# Patient Record
Sex: FEMALE | Employment: OTHER | URBAN - METROPOLITAN AREA
[De-identification: names, ages, dates, MRNs, and addresses within clinical notes are randomized per-mention and may not be internally consistent; named-entity substitution may affect disease eponyms.]

---

## 2021-07-07 ENCOUNTER — OFFICE VISIT (OUTPATIENT)
Dept: FAMILY MEDICINE CLINIC | Age: 86
End: 2021-07-07
Payer: MEDICARE

## 2021-07-07 VITALS
RESPIRATION RATE: 16 BRPM | OXYGEN SATURATION: 96 % | WEIGHT: 131.8 LBS | BODY MASS INDEX: 26.57 KG/M2 | HEART RATE: 77 BPM | SYSTOLIC BLOOD PRESSURE: 179 MMHG | DIASTOLIC BLOOD PRESSURE: 62 MMHG | HEIGHT: 59 IN

## 2021-07-07 DIAGNOSIS — F41.9 ANXIETY: ICD-10-CM

## 2021-07-07 DIAGNOSIS — H54.7 DECREASED VISUAL ACUITY: ICD-10-CM

## 2021-07-07 DIAGNOSIS — I10 ESSENTIAL HYPERTENSION: Primary | ICD-10-CM

## 2021-07-07 DIAGNOSIS — N81.10 CYSTOCELE, UNSPECIFIED (CODE): ICD-10-CM

## 2021-07-07 PROCEDURE — 99204 OFFICE O/P NEW MOD 45 MIN: CPT | Performed by: FAMILY MEDICINE

## 2021-07-07 PROCEDURE — 1090F PRES/ABSN URINE INCON ASSESS: CPT | Performed by: FAMILY MEDICINE

## 2021-07-07 PROCEDURE — 1101F PT FALLS ASSESS-DOCD LE1/YR: CPT | Performed by: FAMILY MEDICINE

## 2021-07-07 PROCEDURE — G8536 NO DOC ELDER MAL SCRN: HCPCS | Performed by: FAMILY MEDICINE

## 2021-07-07 PROCEDURE — G8419 CALC BMI OUT NRM PARAM NOF/U: HCPCS | Performed by: FAMILY MEDICINE

## 2021-07-07 PROCEDURE — G8510 SCR DEP NEG, NO PLAN REQD: HCPCS | Performed by: FAMILY MEDICINE

## 2021-07-07 PROCEDURE — G8427 DOCREV CUR MEDS BY ELIG CLIN: HCPCS | Performed by: FAMILY MEDICINE

## 2021-07-07 RX ORDER — HYDROCHLOROTHIAZIDE 12.5 MG/1
CAPSULE ORAL
COMMUNITY
Start: 2021-04-13 | End: 2021-08-30 | Stop reason: SDUPTHER

## 2021-07-07 RX ORDER — PRAVASTATIN SODIUM 40 MG/1
40 TABLET ORAL
COMMUNITY
End: 2021-08-30

## 2021-07-07 RX ORDER — ACETAMINOPHEN 325 MG/1
TABLET ORAL
COMMUNITY

## 2021-07-07 RX ORDER — LEVOTHYROXINE SODIUM 75 UG/1
TABLET ORAL
COMMUNITY
Start: 2021-06-03 | End: 2021-08-30 | Stop reason: SDUPTHER

## 2021-07-07 RX ORDER — SERTRALINE HYDROCHLORIDE 25 MG/1
25 TABLET, FILM COATED ORAL DAILY
Qty: 30 TABLET | Refills: 0
Start: 2021-07-07 | End: 2021-08-30

## 2021-07-07 RX ORDER — LORAZEPAM 0.5 MG/1
TABLET ORAL
COMMUNITY
End: 2021-08-30

## 2021-07-07 RX ORDER — METOPROLOL SUCCINATE 50 MG/1
TABLET, EXTENDED RELEASE ORAL
COMMUNITY
Start: 2021-06-27 | End: 2021-08-30 | Stop reason: SDUPTHER

## 2021-07-07 RX ORDER — MOMETASONE FUROATE 50 UG/1
2 SPRAY, METERED NASAL DAILY
COMMUNITY
End: 2021-08-30 | Stop reason: SDUPTHER

## 2021-07-07 NOTE — PROGRESS NOTES
Identified pt with two pt identifiers(name and ). Reviewed record in preparation for visit and have obtained necessary documentation. Chief Complaint   Patient presents with    New Patient     High BP    Establish Care     Pt would like for us to get records from her doctor in  [place], [place] instead of her previous family practice doctor if we need any records. Health Maintenance Due   Topic    Lipid Screen     COVID-19 Vaccine (1)    DTaP/Tdap/Td series (1 - Tdap)    Shingrix Vaccine Age 50> (1 of 2)    Bone Densitometry (Dexa) Screening     Pneumococcal 65+ years (1 of 1 - PPSV23)       Visit Vitals  BP (!) 179/62   Pulse 77   Resp 16   Ht 4' 11\" (1.499 m)   Wt 131 lb 12.8 oz (59.8 kg)   SpO2 96%   BMI 26.62 kg/m²         Coordination of Care Questionnaire:  :   1) Have you been to an emergency room, urgent care, or hospitalized since your last visit? If yes, where when, and reason for visit? NO     2. Have seen or consulted any other health care provider since your last visit? If yes, where when, and reason for visit? [facility]    Patient is accompanied by SELF I have received verbal consent from Seth Sanchez to discuss any/all medical information while they are present in the room.

## 2021-07-07 NOTE — PROGRESS NOTES
HISTORY OF PRESENT ILLNESS  Maria E Moody is a 80 y.o. female. Maria E Moody is here to follow up on their HTN. This is a chronic problem. Compliant with meds. She is very stressed today. Her home blood pressures have been [id number]/, mostly below 140/80. She needs an eye doctor. Also, she needs a dentist.    She needs a gyn for her cystocele. Also, her previous PCP gave her Zoloft for her anxiety, but she hasn't taken it yet. Review of Systems   Eyes: Positive for blurred vision. Respiratory: Negative for shortness of breath. Cardiovascular: Negative for chest pain. Neurological: Negative for dizziness, sensory change, speech change, focal weakness and headaches. Visit Vitals  BP (!) 179/62   Pulse 77   Resp 16   Ht 4' 11\" (1.499 m)   Wt 131 lb 12.8 oz (59.8 kg)   SpO2 96%   BMI 26.62 kg/m²     Physical Exam  Vitals and nursing note reviewed. Constitutional:       General: She is not in acute distress. Appearance: She is well-developed. She is not diaphoretic. Cardiovascular:      Rate and Rhythm: Normal rate and regular rhythm. Heart sounds: Normal heart sounds. No murmur heard. No friction rub. No gallop. Pulmonary:      Effort: Pulmonary effort is normal. No respiratory distress. Breath sounds: Normal breath sounds. No wheezing or rales. Skin:     General: Skin is warm and dry. Neurological:      Mental Status: She is alert and oriented to person, place, and time. ASSESSMENT and PLAN    ICD-10-CM ICD-9-CM    1. Essential hypertension  R13 092.6 METABOLIC PANEL, BASIC   2. Anxiety  F41.9 300.00 sertraline (Zoloft) 25 mg tablet   3. Cystocele, unspecified (CODE)  N81.10 [id number] REFERRAL TO GYNECOLOGY   4. Decreased visual acuity  H54.7 369.9 REFERRAL TO OPHTHALMOLOGY        Blood pressure controlled at home  Labs per orders. Continue current plans.   Start Zoloft  Dental referral  Gynecology referral  Ophthalmology referral    Follow-up and Dispositions    · Return in about 3 weeks (around 7/28/2021) for anxiety. Reviewed plan of care. Patient has provided input and agrees with goals.

## 2021-07-20 ENCOUNTER — OFFICE VISIT (OUTPATIENT)
Dept: OBGYN CLINIC | Age: 86
End: 2021-07-20
Payer: MEDICARE

## 2021-07-20 VITALS
BODY MASS INDEX: 25.8 KG/M2 | WEIGHT: 128 LBS | SYSTOLIC BLOOD PRESSURE: 191 MMHG | HEART RATE: 74 BPM | DIASTOLIC BLOOD PRESSURE: 82 MMHG | HEIGHT: 59 IN

## 2021-07-20 DIAGNOSIS — N81.89 PELVIC FLOOR WEAKNESS: ICD-10-CM

## 2021-07-20 DIAGNOSIS — N81.11 CYSTOCELE, MIDLINE: Primary | ICD-10-CM

## 2021-07-20 PROCEDURE — G8536 NO DOC ELDER MAL SCRN: HCPCS | Performed by: OBSTETRICS & GYNECOLOGY

## 2021-07-20 PROCEDURE — G8432 DEP SCR NOT DOC, RNG: HCPCS | Performed by: OBSTETRICS & GYNECOLOGY

## 2021-07-20 PROCEDURE — 1090F PRES/ABSN URINE INCON ASSESS: CPT | Performed by: OBSTETRICS & GYNECOLOGY

## 2021-07-20 PROCEDURE — 1101F PT FALLS ASSESS-DOCD LE1/YR: CPT | Performed by: OBSTETRICS & GYNECOLOGY

## 2021-07-20 PROCEDURE — G8419 CALC BMI OUT NRM PARAM NOF/U: HCPCS | Performed by: OBSTETRICS & GYNECOLOGY

## 2021-07-20 PROCEDURE — 99203 OFFICE O/P NEW LOW 30 MIN: CPT | Performed by: OBSTETRICS & GYNECOLOGY

## 2021-07-20 PROCEDURE — G8427 DOCREV CUR MEDS BY ELIG CLIN: HCPCS | Performed by: OBSTETRICS & GYNECOLOGY

## 2021-07-20 NOTE — PROGRESS NOTES
This is a Sandip Sprain is a 80 y.o. female female, [facility], who presents for evaluation for a bladder prolapse. This problem had been going on for a at least 6-7 years. She complains of a  bulge. She has no additional symptoms. No pain. Does not protrude outside the vaginal opening. Was followed by specialist in [place] who advised PT. Did this, not clear how helpful it was. Has not done recently. Would like to avoid surgery if possible. Was advised by her PCP 6-7 years ago to apply a \"salve\" - combination of balmex and A&D ointment. Uses this daily with good results. Recently tx'd with Macrobid for UTI. Having \"digestive\" issues. Took probiotic, but only while she was on the abx. Past Medical History:   Diagnosis Date    Anxiety     Cystocele, unspecified (CODE)     Hearing loss     History of tobacco abuse     Hypertension         Past Surgical History:   Procedure Laterality Date    HX CATARACT REMOVAL Bilateral     HX CHOLECYSTECTOMY      HX HERNIA REPAIR         Social History     Socioeconomic History    Marital status: UNKNOWN     Spouse name: Not on file    Number of children: Not on file    Years of education: Not on file    Highest education level: Not on file   Tobacco Use    Smoking status: Former Smoker     Quit date:      Years since quittin.11    Smokeless tobacco: Never Used   Vaping Use    Vaping Use: Never used   Substance and Sexual Activity    Alcohol use: Yes     Comment: rare    Drug use: Never    Sexual activity: Not Currently     Partners: Male     Social Determinants of Health     Financial Resource Strain:     Difficulty of Paying Living Expenses:    Food Insecurity:     Worried About Running Out of Food in the Last Year:     Ran Out of Food in the Last Year:    Transportation Needs:     Lack of Transportation (Medical):      Lack of Transportation (Non-Medical):    Physical Activity:     Days of Exercise per Week:     Minutes of Exercise per Session:    Stress:     Feeling of Stress :    Social Connections:     Frequency of Communication with Friends and Family:     Frequency of Social Gatherings with Friends and Family:     Attends Buddhist Services:     Active Member of Clubs or Organizations:     Attends Club or Organization Meetings:     Marital Status:            OB History   No obstetric history on file. PHYSICAL EXAMINATION    Visit Vitals  BP (!) 191/82   Pulse 74   Ht 4' 11\" (1.499 m)   Wt 128 lb (58.1 kg)   BMI 25.85 kg/m²         Constitutional  · Appearance: well-nourished, well developed, alert, in no acute distress    HENT  · Head and Face: appears normal      Genitourinary  · External Genitalia: normal appearance for age with atrophy, no discharge present, no tenderness present, no inflammatory lesions present, no masses present  · Vagina:atrophic vaginal vault with pink epithelium and flattening of rugae, mucosa intact, no ulceration, with gr 1-2 cystocele (near hymenal ring, does not protrude through, poor tone, no discharge present, no inflammatory lesions present, no masses present  · Bladder: non-tender to palpation  · Urethra: appears normal  · Cervix: wnl   · Uterus: sm, NT  · Adnexa: no adnexal tenderness present, no adnexal masses present  · Perineum: perineum within normal limits, no evidence of trauma, no rashes or skin lesions present  · Anus: anus within normal limits, no hemorrhoids present  · Inguinal Lymph Nodes: no lymphadenopathy present    Skin  · General Inspection: no rash, no lesions identified    Neurologic/Psychiatric  · Mental Status:  · Orientation: grossly oriented to person, place and time  · Mood and Affect: mood normal, affect appropriate          A&P - cystocele. Minimally symptomatic. Poor tone. Wishes to avoid surgery. Discussed surgery only needed if she is having significant sx such as pain, break down of tissue, recurrent UTI.   She wishes to avoid surgery if possible. Discussed poor tone, could re-establish with PT. Is interested in this but does not want to initiate tx until after her granddaughter's wedding in Sept.  PT referral placed  Can cont applying A&D ointment or just plain vaseline to protect the tissue. Suggest she take probiotics for another couple of weeks. If continues with digestive issues, then should f/u with PCP. If interested in pursuing surgery, should see Dr. Romana Del.

## 2021-08-02 ENCOUNTER — TELEPHONE (OUTPATIENT)
Dept: FAMILY MEDICINE CLINIC | Age: 86
End: 2021-08-02

## 2021-08-02 NOTE — TELEPHONE ENCOUNTER
Pt states she has had problems with diarrhea and stomach pain for a few weeks and only started on the Zoloft a few days ago. She is asking what she should take for the digestive issues and also if she should keep her virtual phone visit for 8/11/21 since she has not been on the Zoloft very long?   Please call [phone]

## 2021-08-03 ENCOUNTER — VIRTUAL VISIT (OUTPATIENT)
Dept: FAMILY MEDICINE CLINIC | Age: 86
End: 2021-08-03
Payer: MEDICARE

## 2021-08-03 DIAGNOSIS — R19.7 DIARRHEA, UNSPECIFIED TYPE: Primary | ICD-10-CM

## 2021-08-03 DIAGNOSIS — R14.3 FLATUS: ICD-10-CM

## 2021-08-03 DIAGNOSIS — R14.2 BELCHING: ICD-10-CM

## 2021-08-03 PROCEDURE — 99443 PR PHYS/QHP TELEPHONE EVALUATION 21-30 MIN: CPT | Performed by: FAMILY MEDICINE

## 2021-08-03 RX ORDER — SIMETHICONE 80 MG
80 TABLET,CHEWABLE ORAL
COMMUNITY
Start: 2021-08-03 | End: 2021-08-30

## 2021-08-03 RX ORDER — LACTOBACILLUS COMBINATION NO.4 3B CELL
CAPSULE ORAL
COMMUNITY
Start: 2021-08-03 | End: 2022-01-28

## 2021-08-03 NOTE — PROGRESS NOTES
Sana Cavanaugh is a 80 y.o. female evaluated via telephone on 8/3/2021. Consent:  She and/or health care decision maker is aware that she may receive a bill for this telephone service, depending on her insurance coverage, and has provided verbal consent to proceed: Yes      Documentation:  I communicated with the patient and/or health care decision maker about her GI symptoms. Details of this discussion including any medical advice provided:       Subjective:   Sana Cavanaugh was seen for Diarrhea (sicne starting Zoloft ) and Abdominal Pain (since starting Zoloft )      Patient presents with:  Diarrhea: sicne starting Zoloft   Abdominal Pain: since starting Zoloft     Evidently, this started after taking antibiotics for a UTI about a month ago. She just started Zoloft last week, but only took it 4 days because because she was having such GI distress, however, it did not make her worse. Also, she is very stressed right now. The diarrhea has resolved, but she is hardly eating anything. She is down to one, formed stool a day, but she is eating a bland diet. Also, she has a lot of gas and belching. Review of Systems   Constitutional: Negative for chills, fever and weight loss. Gastrointestinal: Negative for abdominal pain, blood in stool, constipation, diarrhea, heartburn, melena, nausea and vomiting. Objective:         Assessment & Plan:   Diagnoses and all orders for this visit:    1. Diarrhea, unspecified type    2. Flatus    3. Belching        Diarrhea resolved, still with some GI upset  Likely the antibiotic causing her symptoms  Hold the Zoloft and restart in one week  Simethicone prn  Probiotics     Follow-up and Dispositions    · Return in about 1 month (around 9/3/2021) for anxiety. Reviewed plan of care. Patient has provided input and agrees with goals.       I affirm this is a Patient Initiated Episode with an Established Patient who has not had a related appointment within my department in the past 7 days or scheduled within the next 24 hours.     Total Time: minutes: 21-30 minutes    Note: not billable if this call serves to triage the patient into an appointment for the relevant concern      Valarie Blanco MD

## 2021-08-03 NOTE — PROGRESS NOTES
Chief Complaint   Patient presents with    Diarrhea     sicne starting Zoloft     Abdominal Pain     since starting Zoloft

## 2021-08-30 ENCOUNTER — OFFICE VISIT (OUTPATIENT)
Dept: FAMILY MEDICINE CLINIC | Age: 86
End: 2021-08-30
Payer: MEDICARE

## 2021-08-30 VITALS
DIASTOLIC BLOOD PRESSURE: 68 MMHG | OXYGEN SATURATION: 99 % | HEART RATE: 74 BPM | RESPIRATION RATE: 18 BRPM | WEIGHT: 127 LBS | BODY MASS INDEX: 25.6 KG/M2 | SYSTOLIC BLOOD PRESSURE: 130 MMHG | HEIGHT: 59 IN | TEMPERATURE: 97.1 F

## 2021-08-30 DIAGNOSIS — Z78.0 MENOPAUSE: ICD-10-CM

## 2021-08-30 DIAGNOSIS — F41.9 ANXIETY: Primary | ICD-10-CM

## 2021-08-30 DIAGNOSIS — E03.9 ACQUIRED HYPOTHYROIDISM: ICD-10-CM

## 2021-08-30 DIAGNOSIS — I10 ESSENTIAL HYPERTENSION: ICD-10-CM

## 2021-08-30 DIAGNOSIS — M81.0 AGE-RELATED OSTEOPOROSIS WITHOUT CURRENT PATHOLOGICAL FRACTURE: ICD-10-CM

## 2021-08-30 PROCEDURE — 99215 OFFICE O/P EST HI 40 MIN: CPT | Performed by: FAMILY MEDICINE

## 2021-08-30 PROCEDURE — 1101F PT FALLS ASSESS-DOCD LE1/YR: CPT | Performed by: FAMILY MEDICINE

## 2021-08-30 PROCEDURE — G8427 DOCREV CUR MEDS BY ELIG CLIN: HCPCS | Performed by: FAMILY MEDICINE

## 2021-08-30 PROCEDURE — G8536 NO DOC ELDER MAL SCRN: HCPCS | Performed by: FAMILY MEDICINE

## 2021-08-30 PROCEDURE — 1090F PRES/ABSN URINE INCON ASSESS: CPT | Performed by: FAMILY MEDICINE

## 2021-08-30 PROCEDURE — G8419 CALC BMI OUT NRM PARAM NOF/U: HCPCS | Performed by: FAMILY MEDICINE

## 2021-08-30 PROCEDURE — G8510 SCR DEP NEG, NO PLAN REQD: HCPCS | Performed by: FAMILY MEDICINE

## 2021-08-30 RX ORDER — MOMETASONE FUROATE 50 UG/1
2 SPRAY, METERED NASAL DAILY
Qty: 3 CONTAINER | Refills: 4 | Status: SHIPPED | OUTPATIENT
Start: 2021-08-30 | End: 2021-09-01 | Stop reason: ALTCHOICE

## 2021-08-30 RX ORDER — HYDROCHLOROTHIAZIDE 12.5 MG/1
CAPSULE ORAL
Qty: 90 CAPSULE | Refills: 0 | Status: SHIPPED | OUTPATIENT
Start: 2021-08-30 | End: 2022-01-28

## 2021-08-30 RX ORDER — METOPROLOL SUCCINATE 50 MG/1
50 TABLET, EXTENDED RELEASE ORAL DAILY
Qty: 90 TABLET | Refills: 0 | Status: SHIPPED | OUTPATIENT
Start: 2021-08-30 | End: 2021-09-07

## 2021-08-30 RX ORDER — LEVOTHYROXINE SODIUM 75 UG/1
75 TABLET ORAL DAILY
Qty: 90 TABLET | Refills: 0 | Status: SHIPPED | OUTPATIENT
Start: 2021-08-30 | End: 2021-11-24

## 2021-08-30 RX ORDER — LORAZEPAM 0.5 MG/1
0.5 TABLET ORAL
Qty: 30 TABLET | Refills: 0
Start: 2021-08-30 | End: 2022-01-28

## 2021-08-30 RX ORDER — SERTRALINE HYDROCHLORIDE 50 MG/1
50 TABLET, FILM COATED ORAL DAILY
Qty: 30 TABLET | Refills: 0 | Status: SHIPPED | OUTPATIENT
Start: 2021-08-30 | End: 2021-09-20 | Stop reason: SDUPTHER

## 2021-08-30 NOTE — PROGRESS NOTES
HISTORY OF PRESENT ILLNESS  Ravindra Persaud is a 80 y.o. female. Ravindra Persaud is here to follow up on their anxiety. At her last visit, I started her on Zoloft. She is better. Because she started Zoloft, she thought she needed to stop her Ativan, so she did, however, she needs it from time to time. She has HTN and her SBP was extremely elevated today. The patient notes it is due to her anxiety. It came down to normal after talking with me and relaxing. Review of Systems   Constitutional: Negative for malaise/fatigue and weight loss. No weight gain   Eyes: Negative for blurred vision. Respiratory: Negative for shortness of breath. Cardiovascular: Negative for chest pain and palpitations. Neurological: Positive for dizziness. Negative for sensory change, speech change, focal weakness and headaches. She has chronic vertigo   Psychiatric/Behavioral: Negative for depression, hallucinations, substance abuse and suicidal ideas. The patient is not nervous/anxious and does not have insomnia. Visit Vitals  /68 Comment: left arm, manual cuff after relaxation   Pulse 74   Temp 97.1 °F (36.2 °C) (Temporal)   Resp 18   Ht 4' 11\" (1.499 m)   Wt 127 lb (57.6 kg)   SpO2 99%   BMI 25.65 kg/m²     Physical Exam  Vitals and nursing note reviewed. Constitutional:       General: She is not in acute distress. Appearance: Normal appearance. She is well-developed. She is not diaphoretic. Cardiovascular:      Rate and Rhythm: Normal rate and regular rhythm. Heart sounds: Normal heart sounds. No murmur heard. No friction rub. No gallop. Pulmonary:      Effort: Pulmonary effort is normal. No respiratory distress. Breath sounds: Normal breath sounds. No wheezing or rales. Skin:     General: Skin is warm and dry. Neurological:      Mental Status: She is alert and oriented to person, place, and time. Motor: No tremor.    Psychiatric:         Mood and Affect: Mood normal. Behavior: Behavior normal.         Thought Content: Thought content normal.         Judgment: Judgment normal.         ASSESSMENT and PLAN    ICD-10-CM ICD-9-CM    1. Anxiety  F41.9 300.00 LORazepam (ATIVAN) 0.5 mg tablet      sertraline (Zoloft) 50 mg tablet   2. Essential hypertension  I10 401.9 hydroCHLOROthiazide (MICROZIDE) 12.5 mg capsule      metoprolol succinate (TOPROL-XL) 50 mg XL tablet   3. Acquired hypothyroidism  E03.9 244.9 Synthroid 75 mcg tablet   4. Age-related osteoporosis without current pathological fracture  M81.0 733.01 DEXA BONE DENSITY STUDY AXIAL   5. Menopause  Z78.0 627.2 DEXA BONE DENSITY STUDY AXIAL        Anxiety improving  SBP quite elevated when she is anxious  Increase Zoloft  Restart prn Ativan as her blood pressure is concerningly elevated when she becomes anxious  Bone density    Over 40 minutes was spent in patient care      Follow-up and Dispositions    · Return in about 3 weeks (around 9/20/2021) for anxiety. Reviewed plan of care. Patient has provided input and agrees with goals.

## 2021-08-30 NOTE — PROGRESS NOTES
Chief Complaint   Patient presents with    Anxiety     4 wk f/u     1. Have you been to the ER, urgent care clinic since your last visit? Hospitalized since your last visit? No     2. Have you seen or consulted any other health care providers outside of the [address] [place] [address] since your last visit? Include any pap smears or colon screening.  No     Visit Vitals  BP (!) 200/80   Pulse 74   Temp 97.1 °F (36.2 °C) (Temporal)   Resp 18   Ht 4' 11\" (1.499 m)   Wt 127 lb (57.6 kg)   SpO2 99%   BMI 25.65 kg/m²

## 2021-08-31 ENCOUNTER — TELEPHONE (OUTPATIENT)
Dept: FAMILY MEDICINE CLINIC | Age: 86
End: 2021-08-31

## 2021-09-01 ENCOUNTER — TELEPHONE (OUTPATIENT)
Dept: FAMILY MEDICINE CLINIC | Age: 86
End: 2021-09-01

## 2021-09-01 RX ORDER — FLUNISOLIDE 0.25 MG/ML
2 SOLUTION NASAL 2 TIMES DAILY
Qty: 3 ML | Refills: 4 | Status: SHIPPED | OUTPATIENT
Start: 2021-09-01 | End: 2022-01-28

## 2021-09-01 NOTE — TELEPHONE ENCOUNTER
Mometasone is not covered. Pt will need to have tried and failed both   Flunisolide and Levocetirizine.

## 2021-09-01 NOTE — TELEPHONE ENCOUNTER
----- Message from Blanquita Leal sent at 9/1/2021  2:33 PM EDT -----  Regarding: Dr. Kacie Perdue: [phone]  General Message/Vendor Calls    Caller's first and last name: N/A      Reason for call: PT would like to discuss two medications recently prescribed to her, costing $527.99 and $150 for allergies. Callback required yes/no and why: yes/follow up       Best contact number(s): [phone]      Details to clarify the request: PT states allergies were not discussed in visit and insurance company is questioning her regarding medication was prescribed. PT also states she cannot afford these medications.        Blanquita Leal

## 2021-09-04 DIAGNOSIS — I10 ESSENTIAL HYPERTENSION: ICD-10-CM

## 2021-09-07 RX ORDER — METOPROLOL SUCCINATE 50 MG/1
TABLET, EXTENDED RELEASE ORAL
Qty: 90 TABLET | Refills: 3 | Status: SHIPPED | OUTPATIENT
Start: 2021-09-07 | End: 2022-01-28

## 2021-09-20 ENCOUNTER — OFFICE VISIT (OUTPATIENT)
Dept: FAMILY MEDICINE CLINIC | Age: 86
End: 2021-09-20
Payer: MEDICARE

## 2021-09-20 VITALS
SYSTOLIC BLOOD PRESSURE: 183 MMHG | HEART RATE: 72 BPM | HEIGHT: 59 IN | WEIGHT: 124 LBS | TEMPERATURE: 97 F | BODY MASS INDEX: 25 KG/M2 | DIASTOLIC BLOOD PRESSURE: 67 MMHG | OXYGEN SATURATION: 97 % | RESPIRATION RATE: 20 BRPM

## 2021-09-20 DIAGNOSIS — I10 ESSENTIAL HYPERTENSION: ICD-10-CM

## 2021-09-20 DIAGNOSIS — F41.9 ANXIETY: Primary | ICD-10-CM

## 2021-09-20 DIAGNOSIS — R41.3 MEMORY LOSS: ICD-10-CM

## 2021-09-20 DIAGNOSIS — Z00.00 MEDICARE ANNUAL WELLNESS VISIT, SUBSEQUENT: ICD-10-CM

## 2021-09-20 PROCEDURE — 1090F PRES/ABSN URINE INCON ASSESS: CPT | Performed by: FAMILY MEDICINE

## 2021-09-20 PROCEDURE — G8419 CALC BMI OUT NRM PARAM NOF/U: HCPCS | Performed by: FAMILY MEDICINE

## 2021-09-20 PROCEDURE — 1101F PT FALLS ASSESS-DOCD LE1/YR: CPT | Performed by: FAMILY MEDICINE

## 2021-09-20 PROCEDURE — G8510 SCR DEP NEG, NO PLAN REQD: HCPCS | Performed by: FAMILY MEDICINE

## 2021-09-20 PROCEDURE — G8536 NO DOC ELDER MAL SCRN: HCPCS | Performed by: FAMILY MEDICINE

## 2021-09-20 PROCEDURE — G8427 DOCREV CUR MEDS BY ELIG CLIN: HCPCS | Performed by: FAMILY MEDICINE

## 2021-09-20 PROCEDURE — 99214 OFFICE O/P EST MOD 30 MIN: CPT | Performed by: FAMILY MEDICINE

## 2021-09-20 PROCEDURE — G0439 PPPS, SUBSEQ VISIT: HCPCS | Performed by: FAMILY MEDICINE

## 2021-09-20 RX ORDER — SERTRALINE HYDROCHLORIDE 50 MG/1
50 TABLET, FILM COATED ORAL DAILY
Qty: 90 TABLET | Refills: 0 | Status: SHIPPED | OUTPATIENT
Start: 2021-09-20 | End: 2022-01-28

## 2021-09-20 NOTE — PROGRESS NOTES
This is the Subsequent Medicare Annual Wellness Exam, performed 12 months or more after the Initial AWV or the last Subsequent AWV    I have reviewed the patient's medical history in detail and updated the computerized patient record. Assessment/Plan   Education and counseling provided:  Are appropriate based on today's review and evaluation  Patient has an advanced directive and will bring it in  [address] was discussed but the patient did not wish or was not able to name a surrogate decision maker or provide an [address]     1. Anxiety  -     sertraline (Zoloft) 50 mg tablet; Take 1 Tablet by mouth daily. , Normal, Disp-90 Tablet, R-0  2. Essential hypertension  3. Medicare annual wellness visit, subsequent  4. Memory loss  -     REFERRAL TO NEUROLOGY       Depression Risk Factor Screening     3 most recent PHQ Screens 9/20/2021   Little interest or pleasure in doing things Not at all   Feeling down, depressed, irritable, or hopeless Not at all   Total Score PHQ 2 0       Alcohol Risk Screen   AUDIT Screen Score: AUDIT Score: 1      Document Brief Intervention (corresponds directly with the 5 A's, Ask, Advise, Assess, Assist, and Arrange):  NA    At- Risk Patients (Score 7-15 for women; 8-15 for men)  Discuss concern patient is drinking at unhealthy levels known to increase risk of alcohol-related health problems. Is Patient ready to commit to change? NA    If No:   Encourage reflection   Discuss short term and long term health risks of consuming alcohol   Barriers to change   Reaffirm willingness to help / Educational materials provided  If Yes:   Set goal  [employer] provided    Harmful use or Dependence (Score 16 or greater)   Discuss short term and long term health risks of consuming alcohol   Recommendations   Negotiate drinking goal   Recommend addiction specialist/center   Arrange follow-up appointments.                     Functional Ability and Level of Safety    Hearing: she is [place], and had an appointment just before COVID started, so it is still on her list      Activities of Daily Living:  ADL Assessment 9/20/2021   Feeding yourself No Help Needed   Getting from bed to chair No Help Needed   Getting dressed No Help Needed   Bathing or showering No Help Needed   Walk across the room (includes cane/walker) No Help Needed   Using the telphone No Help Needed   Taking your medications No Help Needed   Preparing meals No Help Needed   Managing money (expenses/bills) No Help Needed   Moderately strenuous housework (laundry) No Help Needed   Shopping for personal items (toiletries/medicines) No Help Needed   Shopping for groceries No Help Needed   Driving No Help Needed   Climbing a flight of stairs No Help Needed   Getting to places beyond walking distances No Help Needed           Ambulation: with no difficulty     Fall Risk:  Fall Risk Assessment, last 12 mths 9/20/2021   Able to walk? Yes   Fall in past 12 months? 0   Do you feel unsteady? 0   Are you worried about falling 0      Abuse Screen:  Abuse Screening Questionnaire 9/20/2021   Do you ever feel afraid of your partner? N   Are you in a relationship with someone who physically or mentally threatens you? N   Is it safe for you to go home?  Y            Cognitive Screening    Has your family/caregiver stated any concerns about your memory: no     Cognitive Screening: Abnormal - MMSE (Mini Mental Status Exam), Verbal Fluency Test    Health Maintenance Due     Health Maintenance Due   Topic Date Due    DTaP/Tdap/Td series (1 - Tdap) Never done    Shingrix Vaccine Age 50> (1 of 2) Never done    Pneumococcal 65+ years (1 of 1 - PPSV23) Never done    Medicare Yearly Exam  Never done    Flu Vaccine (1) Never done       Patient Care Team   Patient Care Team:  Remigio Astorga MD as PCP - General (Family Medicine)  Remigio Astorga MD as PCP [facility] Empaneled Provider    History     Patient Active Problem List Diagnosis Code    Hearing loss H91.90    Hypertension I10    Anxiety F41.9    Cystocele, unspecified (CODE) N81.10    History of tobacco abuse Z87.891    Acquired hypothyroidism E03.9    Age-related osteoporosis without current pathological fracture M81.0     Past Medical History:   Diagnosis Date    Acquired hypothyroidism 8/30/2021    Age-related osteoporosis without current pathological fracture 8/30/2021    Anxiety     Cystocele, unspecified (CODE)     Hearing loss     History of tobacco abuse     Hypertension       Past Surgical History:   Procedure Laterality Date    HX CATARACT REMOVAL Bilateral     HX CHOLECYSTECTOMY      HX HERNIA REPAIR       Current Outpatient Medications   Medication Sig Dispense Refill    metoprolol succinate (TOPROL-XL) 50 mg XL tablet TAKE 1 TABLET BY MOUTH EVERY DAY 90 Tablet 3    flunisolide (NASAREL) 25 mcg (0.025 %) spry 2 Sprays by Both Nostrils route two (2) times a day. 3 mL 4    LORazepam (ATIVAN) 0.5 mg tablet Take 1 Tablet by mouth every six (6) hours as needed for Anxiety. Max Daily Amount: 2 mg. 30 Tablet 0    sertraline (Zoloft) 50 mg tablet Take 1 Tablet by mouth daily. 30 Tablet 0    hydroCHLOROthiazide (MICROZIDE) 12.5 mg capsule TAKE 1 CAPSULE BY MOUTH EVERY DAY IN THE MORNING 90 Capsule 0    Synthroid 75 mcg tablet Take 1 Tablet by mouth daily. 90 Tablet 0    lactobacillus combination no.4 (Probiotic) 3 billion cell cap Take  by mouth.  acetaminophen (TylenoL) 325 mg tablet Take  by mouth every four (4) hours as needed for Pain.        Allergies   Allergen Reactions    Fentanyl Other (comments)     Duragesic patch-- allergy as well    Gabapentin Other (comments)     Confusion        Family History   Problem Relation Age of Onset    Hypertension Mother     Elevated Lipids Mother     Cancer Mother         stomach    No Known Problems Father     No Known Problems Brother     No Known Problems Brother     No Known Problems Brother  No Known Problems Brother     No Known Problems Brother      Social History     Tobacco Use    Smoking status: Former Smoker     Quit date:      Years since quittin.7    Smokeless tobacco: Never Used   Substance Use Topics    Alcohol use: Yes     Comment: danyel Muñiz MD

## 2021-09-20 NOTE — PATIENT INSTRUCTIONS

## 2021-09-20 NOTE — PROGRESS NOTES
HISTORY OF PRESENT ILLNESS  Cecile Redmond is a 80 y.o. female. Cecile Redmond is here to follow up on their anxiety. He/she is unchanged, but she has a wedding coming up this weekend and is looking for a place in independent living. Her lease is up in the spring. .     She has HTN and her blood pressure is elevated today. Her home blood pressures have been [phone]. Review of Systems   Constitutional: Negative for malaise/fatigue and weight loss. No weight gain   Eyes: Negative for blurred vision. Respiratory: Negative for shortness of breath. Cardiovascular: Negative for chest pain and palpitations. Neurological: Negative for dizziness, sensory change, speech change, focal weakness and headaches. Psychiatric/Behavioral: Negative for depression, hallucinations, substance abuse and suicidal ideas. The patient is nervous/anxious. The patient does not have insomnia. Visit Vitals  BP (!) 183/67   Pulse 72   Temp 97 °F (36.1 °C) (Temporal)   Resp 20   Ht 4' 11\" (1.499 m)   Wt 124 lb (56.2 kg)   SpO2 97%   BMI 25.04 kg/m²     BP Readings from Last 3 Encounters:   09/20/21 (!) 183/67   08/30/21 130/68   07/20/21 (!) 191/82       Physical Exam  Vitals and nursing note reviewed. Constitutional:       General: She is not in acute distress. Appearance: Normal appearance. She is well-developed. She is not diaphoretic. Cardiovascular:      Rate and Rhythm: Normal rate and regular rhythm. Heart sounds: Normal heart sounds. No murmur heard. No friction rub. No gallop. Pulmonary:      Effort: Pulmonary effort is normal. No respiratory distress. Breath sounds: Normal breath sounds. No wheezing or rales. Skin:     General: Skin is warm and dry. Neurological:      Mental Status: She is alert and oriented to person, place, and time. Motor: No tremor. Psychiatric:         Mood and Affect: Mood normal.         Behavior: Behavior normal.         Thought Content:  Thought content normal.         Judgment: Judgment normal.         ASSESSMENT and PLAN    ICD-10-CM ICD-9-CM    1. Anxiety  F41.9 300.00 sertraline (Zoloft) 50 mg tablet   2. Essential hypertension  I10 401.9    3. Medicare annual wellness visit, subsequent  Z00.00 V70.0    4. Memory loss  R41.3 780.93 REFERRAL TO NEUROLOGY        Anxiety no better, but some situational  Blood pressures acceptable at home  Continue current plans. Refills per orders    Follow-up and Dispositions    · Return in about 6 weeks (around 11/1/2021) for anxiety. Reviewed plan of care. Patient has provided input and agrees with goals.

## 2021-11-24 DIAGNOSIS — E03.9 ACQUIRED HYPOTHYROIDISM: ICD-10-CM

## 2021-11-24 DIAGNOSIS — I10 PRIMARY HYPERTENSION: Primary | ICD-10-CM

## 2021-11-24 RX ORDER — LEVOTHYROXINE SODIUM 75 UG/1
75 TABLET ORAL DAILY
Qty: 90 TABLET | Refills: 0 | Status: SHIPPED | OUTPATIENT
Start: 2021-11-24 | End: 2022-03-14

## 2021-11-24 NOTE — TELEPHONE ENCOUNTER
Called and spoke with pt. She has been advised of this and will have labs drawn on 11/29/2021 the date of her appointment.

## 2021-11-25 ENCOUNTER — HOSPITAL ENCOUNTER (INPATIENT)
Age: 86
LOS: 6 days | Discharge: NURSING, REHAB OR LONG-TERM CARE | DRG: 535 | End: 2021-12-01
Attending: EMERGENCY MEDICINE | Admitting: INTERNAL MEDICINE
Payer: MEDICARE

## 2021-11-25 ENCOUNTER — APPOINTMENT (OUTPATIENT)
Dept: GENERAL RADIOLOGY | Age: 86
DRG: 535 | End: 2021-11-25
Attending: EMERGENCY MEDICINE
Payer: MEDICARE

## 2021-11-25 ENCOUNTER — APPOINTMENT (OUTPATIENT)
Dept: CT IMAGING | Age: 86
DRG: 535 | End: 2021-11-25
Attending: EMERGENCY MEDICINE
Payer: MEDICARE

## 2021-11-25 DIAGNOSIS — S32.401A CLOSED NONDISPLACED FRACTURE OF RIGHT ACETABULUM, UNSPECIFIED PORTION OF ACETABULUM, INITIAL ENCOUNTER (HCC): ICD-10-CM

## 2021-11-25 DIAGNOSIS — S32.591A CLOSED FRACTURE OF MULTIPLE PUBIC RAMI, RIGHT, INITIAL ENCOUNTER (HCC): Primary | ICD-10-CM

## 2021-11-25 DIAGNOSIS — S32.592A CLOSED FRACTURE OF MULTIPLE RAMI OF LEFT PUBIS, INITIAL ENCOUNTER (HCC): ICD-10-CM

## 2021-11-25 PROBLEM — S32.599A CLOSED FRACTURE OF MULTIPLE PUBIC RAMI (HCC): Status: ACTIVE | Noted: 2021-11-25

## 2021-11-25 LAB
ALBUMIN SERPL-MCNC: 3.4 G/DL (ref 3.5–5)
ALBUMIN/GLOB SERPL: 0.8 {RATIO} (ref 1.1–2.2)
ALP SERPL-CCNC: 99 U/L (ref 45–117)
ALT SERPL-CCNC: ABNORMAL U/L (ref 12–78)
ANION GAP SERPL CALC-SCNC: 7 MMOL/L (ref 5–15)
AST SERPL-CCNC: 31 U/L (ref 15–37)
BASOPHILS # BLD: 0.1 K/UL (ref 0–0.1)
BASOPHILS NFR BLD: 1 % (ref 0–1)
BILIRUB SERPL-MCNC: 0.6 MG/DL (ref 0.2–1)
BUN SERPL-MCNC: 15 MG/DL (ref 6–20)
BUN/CREAT SERPL: 14 (ref 12–20)
CALCIUM SERPL-MCNC: 9 MG/DL (ref 8.5–10.1)
CHLORIDE SERPL-SCNC: 98 MMOL/L (ref 97–108)
CO2 SERPL-SCNC: 27 MMOL/L (ref 21–32)
CREAT SERPL-MCNC: 1.05 MG/DL (ref 0.55–1.02)
DIFFERENTIAL METHOD BLD: ABNORMAL
EOSINOPHIL # BLD: 0.1 K/UL (ref 0–0.4)
EOSINOPHIL NFR BLD: 1 % (ref 0–7)
ERYTHROCYTE [DISTWIDTH] IN BLOOD BY AUTOMATED COUNT: 13.7 % (ref 11.5–14.5)
GLOBULIN SER CALC-MCNC: 4.1 G/DL (ref 2–4)
GLUCOSE SERPL-MCNC: 147 MG/DL (ref 65–100)
HCT VFR BLD AUTO: 45 % (ref 35–47)
HGB BLD-MCNC: 14.9 G/DL (ref 11.5–16)
IMM GRANULOCYTES # BLD AUTO: 0.1 K/UL (ref 0–0.04)
IMM GRANULOCYTES NFR BLD AUTO: 1 % (ref 0–0.5)
LYMPHOCYTES # BLD: 1 K/UL (ref 0.8–3.5)
LYMPHOCYTES NFR BLD: 7 % (ref 12–49)
MCH RBC QN AUTO: 29.2 PG (ref 26–34)
MCHC RBC AUTO-ENTMCNC: 33.1 G/DL (ref 30–36.5)
MCV RBC AUTO: 88.2 FL (ref 80–99)
MONOCYTES # BLD: 0.6 K/UL (ref 0–1)
MONOCYTES NFR BLD: 4 % (ref 5–13)
NEUTS SEG # BLD: 12.2 K/UL (ref 1.8–8)
NEUTS SEG NFR BLD: 86 % (ref 32–75)
NRBC # BLD: 0 K/UL (ref 0–0.01)
NRBC BLD-RTO: 0 PER 100 WBC
PLATELET # BLD AUTO: 208 K/UL (ref 150–400)
PMV BLD AUTO: 10.1 FL (ref 8.9–12.9)
POTASSIUM SERPL-SCNC: 4.2 MMOL/L (ref 3.5–5.1)
PROT SERPL-MCNC: 7.5 G/DL (ref 6.4–8.2)
RBC # BLD AUTO: 5.1 M/UL (ref 3.8–5.2)
SODIUM SERPL-SCNC: 132 MMOL/L (ref 136–145)
WBC # BLD AUTO: 14 K/UL (ref 3.6–11)

## 2021-11-25 PROCEDURE — 99284 EMERGENCY DEPT VISIT MOD MDM: CPT

## 2021-11-25 PROCEDURE — 80053 COMPREHEN METABOLIC PANEL: CPT

## 2021-11-25 PROCEDURE — 72100 X-RAY EXAM L-S SPINE 2/3 VWS: CPT

## 2021-11-25 PROCEDURE — 72192 CT PELVIS W/O DYE: CPT

## 2021-11-25 PROCEDURE — 73502 X-RAY EXAM HIP UNI 2-3 VIEWS: CPT

## 2021-11-25 PROCEDURE — 93005 ELECTROCARDIOGRAM TRACING: CPT

## 2021-11-25 PROCEDURE — 71046 X-RAY EXAM CHEST 2 VIEWS: CPT

## 2021-11-25 PROCEDURE — 74011250636 HC RX REV CODE- 250/636: Performed by: EMERGENCY MEDICINE

## 2021-11-25 PROCEDURE — 65270000029 HC RM PRIVATE

## 2021-11-25 PROCEDURE — 73552 X-RAY EXAM OF FEMUR 2/>: CPT

## 2021-11-25 PROCEDURE — 96374 THER/PROPH/DIAG INJ IV PUSH: CPT

## 2021-11-25 PROCEDURE — 85025 COMPLETE CBC W/AUTO DIFF WBC: CPT

## 2021-11-25 PROCEDURE — 36415 COLL VENOUS BLD VENIPUNCTURE: CPT

## 2021-11-25 RX ORDER — MORPHINE SULFATE 2 MG/ML
2 INJECTION, SOLUTION INTRAMUSCULAR; INTRAVENOUS
Status: COMPLETED | OUTPATIENT
Start: 2021-11-25 | End: 2021-11-25

## 2021-11-25 RX ADMIN — MORPHINE SULFATE 2 MG: 2 INJECTION, SOLUTION INTRAMUSCULAR; INTRAVENOUS at 21:34

## 2021-11-26 ENCOUNTER — APPOINTMENT (OUTPATIENT)
Dept: CT IMAGING | Age: 86
DRG: 535 | End: 2021-11-26
Attending: INTERNAL MEDICINE
Payer: MEDICARE

## 2021-11-26 LAB
ALBUMIN SERPL-MCNC: 3.1 G/DL (ref 3.5–5)
ALBUMIN/GLOB SERPL: 0.8 {RATIO} (ref 1.1–2.2)
ALP SERPL-CCNC: 82 U/L (ref 45–117)
ALT SERPL-CCNC: 18 U/L (ref 12–78)
ANION GAP SERPL CALC-SCNC: 7 MMOL/L (ref 5–15)
AST SERPL-CCNC: 17 U/L (ref 15–37)
BASOPHILS # BLD: 0 K/UL (ref 0–0.1)
BASOPHILS NFR BLD: 0 % (ref 0–1)
BILIRUB SERPL-MCNC: 0.5 MG/DL (ref 0.2–1)
BUN SERPL-MCNC: 13 MG/DL (ref 6–20)
BUN/CREAT SERPL: 14 (ref 12–20)
CALCIUM SERPL-MCNC: 8.7 MG/DL (ref 8.5–10.1)
CHLORIDE SERPL-SCNC: 98 MMOL/L (ref 97–108)
CO2 SERPL-SCNC: 27 MMOL/L (ref 21–32)
CREAT SERPL-MCNC: 0.94 MG/DL (ref 0.55–1.02)
DIFFERENTIAL METHOD BLD: ABNORMAL
EOSINOPHIL # BLD: 0 K/UL (ref 0–0.4)
EOSINOPHIL NFR BLD: 0 % (ref 0–7)
ERYTHROCYTE [DISTWIDTH] IN BLOOD BY AUTOMATED COUNT: 13.7 % (ref 11.5–14.5)
EST. AVERAGE GLUCOSE BLD GHB EST-MCNC: 108 MG/DL
GLOBULIN SER CALC-MCNC: 3.7 G/DL (ref 2–4)
GLUCOSE SERPL-MCNC: 147 MG/DL (ref 65–100)
HBA1C MFR BLD: 5.4 % (ref 4–5.6)
HCT VFR BLD AUTO: 40.7 % (ref 35–47)
HGB BLD-MCNC: 13.2 G/DL (ref 11.5–16)
IMM GRANULOCYTES # BLD AUTO: 0.1 K/UL (ref 0–0.04)
IMM GRANULOCYTES NFR BLD AUTO: 1 % (ref 0–0.5)
LYMPHOCYTES # BLD: 0.8 K/UL (ref 0.8–3.5)
LYMPHOCYTES NFR BLD: 8 % (ref 12–49)
MAGNESIUM SERPL-MCNC: 1.8 MG/DL (ref 1.6–2.4)
MCH RBC QN AUTO: 28.4 PG (ref 26–34)
MCHC RBC AUTO-ENTMCNC: 32.4 G/DL (ref 30–36.5)
MCV RBC AUTO: 87.7 FL (ref 80–99)
MONOCYTES # BLD: 0.6 K/UL (ref 0–1)
MONOCYTES NFR BLD: 6 % (ref 5–13)
NEUTS SEG # BLD: 9.3 K/UL (ref 1.8–8)
NEUTS SEG NFR BLD: 85 % (ref 32–75)
NRBC # BLD: 0 K/UL (ref 0–0.01)
NRBC BLD-RTO: 0 PER 100 WBC
PHOSPHATE SERPL-MCNC: 3 MG/DL (ref 2.6–4.7)
PLATELET # BLD AUTO: 176 K/UL (ref 150–400)
PMV BLD AUTO: 9.9 FL (ref 8.9–12.9)
POTASSIUM SERPL-SCNC: 3.6 MMOL/L (ref 3.5–5.1)
PROT SERPL-MCNC: 6.8 G/DL (ref 6.4–8.2)
RBC # BLD AUTO: 4.64 M/UL (ref 3.8–5.2)
SODIUM SERPL-SCNC: 132 MMOL/L (ref 136–145)
TROPONIN-HIGH SENSITIVITY: 10 NG/L (ref 0–51)
TSH SERPL DL<=0.05 MIU/L-ACNC: 1.92 UIU/ML (ref 0.36–3.74)
WBC # BLD AUTO: 10.8 K/UL (ref 3.6–11)

## 2021-11-26 PROCEDURE — 85025 COMPLETE CBC W/AUTO DIFF WBC: CPT

## 2021-11-26 PROCEDURE — 36415 COLL VENOUS BLD VENIPUNCTURE: CPT

## 2021-11-26 PROCEDURE — 65660000000 HC RM CCU STEPDOWN

## 2021-11-26 PROCEDURE — 2709999900 HC NON-CHARGEABLE SUPPLY

## 2021-11-26 PROCEDURE — 83036 HEMOGLOBIN GLYCOSYLATED A1C: CPT

## 2021-11-26 PROCEDURE — 97165 OT EVAL LOW COMPLEX 30 MIN: CPT | Performed by: OCCUPATIONAL THERAPIST

## 2021-11-26 PROCEDURE — 83735 ASSAY OF MAGNESIUM: CPT

## 2021-11-26 PROCEDURE — 71250 CT THORAX DX C-: CPT

## 2021-11-26 PROCEDURE — 97530 THERAPEUTIC ACTIVITIES: CPT

## 2021-11-26 PROCEDURE — 84100 ASSAY OF PHOSPHORUS: CPT

## 2021-11-26 PROCEDURE — 74011250636 HC RX REV CODE- 250/636: Performed by: INTERNAL MEDICINE

## 2021-11-26 PROCEDURE — 70450 CT HEAD/BRAIN W/O DYE: CPT

## 2021-11-26 PROCEDURE — 80053 COMPREHEN METABOLIC PANEL: CPT

## 2021-11-26 PROCEDURE — 97535 SELF CARE MNGMENT TRAINING: CPT | Performed by: OCCUPATIONAL THERAPIST

## 2021-11-26 PROCEDURE — 72125 CT NECK SPINE W/O DYE: CPT

## 2021-11-26 PROCEDURE — 97162 PT EVAL MOD COMPLEX 30 MIN: CPT

## 2021-11-26 PROCEDURE — 84484 ASSAY OF TROPONIN QUANT: CPT

## 2021-11-26 PROCEDURE — 94761 N-INVAS EAR/PLS OXIMETRY MLT: CPT

## 2021-11-26 PROCEDURE — 84443 ASSAY THYROID STIM HORMONE: CPT

## 2021-11-26 PROCEDURE — 74150 CT ABDOMEN W/O CONTRAST: CPT

## 2021-11-26 PROCEDURE — 74011250637 HC RX REV CODE- 250/637: Performed by: INTERNAL MEDICINE

## 2021-11-26 RX ORDER — MORPHINE SULFATE 2 MG/ML
2 INJECTION, SOLUTION INTRAMUSCULAR; INTRAVENOUS
Status: DISCONTINUED | OUTPATIENT
Start: 2021-11-26 | End: 2021-12-01 | Stop reason: HOSPADM

## 2021-11-26 RX ORDER — ONDANSETRON 4 MG/1
4 TABLET, ORALLY DISINTEGRATING ORAL
Status: DISCONTINUED | OUTPATIENT
Start: 2021-11-26 | End: 2021-12-01 | Stop reason: HOSPADM

## 2021-11-26 RX ORDER — SODIUM CHLORIDE 0.9 % (FLUSH) 0.9 %
5-40 SYRINGE (ML) INJECTION EVERY 8 HOURS
Status: DISCONTINUED | OUTPATIENT
Start: 2021-11-26 | End: 2021-12-01 | Stop reason: HOSPADM

## 2021-11-26 RX ORDER — ONDANSETRON 2 MG/ML
4 INJECTION INTRAMUSCULAR; INTRAVENOUS
Status: DISCONTINUED | OUTPATIENT
Start: 2021-11-26 | End: 2021-12-01 | Stop reason: HOSPADM

## 2021-11-26 RX ORDER — HEPARIN SODIUM 5000 [USP'U]/ML
5000 INJECTION, SOLUTION INTRAVENOUS; SUBCUTANEOUS EVERY 8 HOURS
Status: DISCONTINUED | OUTPATIENT
Start: 2021-11-26 | End: 2021-12-01 | Stop reason: HOSPADM

## 2021-11-26 RX ORDER — SODIUM CHLORIDE 0.9 % (FLUSH) 0.9 %
5-40 SYRINGE (ML) INJECTION AS NEEDED
Status: DISCONTINUED | OUTPATIENT
Start: 2021-11-26 | End: 2021-12-01 | Stop reason: HOSPADM

## 2021-11-26 RX ORDER — OXYCODONE AND ACETAMINOPHEN 5; 325 MG/1; MG/1
1 TABLET ORAL
Status: DISCONTINUED | OUTPATIENT
Start: 2021-11-26 | End: 2021-12-01 | Stop reason: HOSPADM

## 2021-11-26 RX ORDER — ACETAMINOPHEN 650 MG/1
650 SUPPOSITORY RECTAL
Status: DISCONTINUED | OUTPATIENT
Start: 2021-11-26 | End: 2021-12-01 | Stop reason: HOSPADM

## 2021-11-26 RX ORDER — ACETAMINOPHEN 325 MG/1
650 TABLET ORAL
Status: DISCONTINUED | OUTPATIENT
Start: 2021-11-26 | End: 2021-12-01 | Stop reason: HOSPADM

## 2021-11-26 RX ORDER — HYDRALAZINE HYDROCHLORIDE 20 MG/ML
10 INJECTION INTRAMUSCULAR; INTRAVENOUS
Status: DISCONTINUED | OUTPATIENT
Start: 2021-11-26 | End: 2021-12-01 | Stop reason: HOSPADM

## 2021-11-26 RX ORDER — SODIUM CHLORIDE, SODIUM LACTATE, POTASSIUM CHLORIDE, CALCIUM CHLORIDE 600; 310; 30; 20 MG/100ML; MG/100ML; MG/100ML; MG/100ML
75 INJECTION, SOLUTION INTRAVENOUS CONTINUOUS
Status: DISCONTINUED | OUTPATIENT
Start: 2021-11-26 | End: 2021-11-28

## 2021-11-26 RX ORDER — POLYETHYLENE GLYCOL 3350 17 G/17G
17 POWDER, FOR SOLUTION ORAL DAILY PRN
Status: DISCONTINUED | OUTPATIENT
Start: 2021-11-26 | End: 2021-12-01 | Stop reason: HOSPADM

## 2021-11-26 RX ADMIN — Medication 10 ML: at 21:47

## 2021-11-26 RX ADMIN — HEPARIN SODIUM 5000 UNITS: 5000 INJECTION INTRAVENOUS; SUBCUTANEOUS at 14:00

## 2021-11-26 RX ADMIN — MORPHINE SULFATE 2 MG: 2 INJECTION, SOLUTION INTRAMUSCULAR; INTRAVENOUS at 19:19

## 2021-11-26 RX ADMIN — HEPARIN SODIUM 5000 UNITS: 5000 INJECTION INTRAVENOUS; SUBCUTANEOUS at 05:15

## 2021-11-26 RX ADMIN — SODIUM CHLORIDE, POTASSIUM CHLORIDE, SODIUM LACTATE AND CALCIUM CHLORIDE 75 ML/HR: 600; 310; 30; 20 INJECTION, SOLUTION INTRAVENOUS at 19:22

## 2021-11-26 RX ADMIN — Medication 10 ML: at 09:48

## 2021-11-26 RX ADMIN — MORPHINE SULFATE 2 MG: 2 INJECTION, SOLUTION INTRAMUSCULAR; INTRAVENOUS at 09:59

## 2021-11-26 RX ADMIN — MORPHINE SULFATE 2 MG: 2 INJECTION, SOLUTION INTRAMUSCULAR; INTRAVENOUS at 05:14

## 2021-11-26 RX ADMIN — OXYCODONE AND ACETAMINOPHEN 1 TABLET: 5; 325 TABLET ORAL at 21:46

## 2021-11-26 RX ADMIN — SODIUM CHLORIDE, POTASSIUM CHLORIDE, SODIUM LACTATE AND CALCIUM CHLORIDE 75 ML/HR: 600; 310; 30; 20 INJECTION, SOLUTION INTRAVENOUS at 05:14

## 2021-11-26 RX ADMIN — Medication 10 ML: at 15:07

## 2021-11-26 RX ADMIN — HEPARIN SODIUM 5000 UNITS: 5000 INJECTION INTRAVENOUS; SUBCUTANEOUS at 21:46

## 2021-11-26 NOTE — ED PROVIDER NOTES
Ms. Tad Hines is an 81yo female who presents to the ER with complaints of right hip pain. She said that she was carrying a box of food in her hands. She is not exactly sure how she fell. She believes that the box became unsteady and made her fall backwards. She did not hit her head. She not lose consciousness. She felt well prior to the fall. No lightheadedness or dizziness. No chest pain or trouble breathing. She denies any recent changes to her urine or bowel movements. She denies any new numbness, tingling, or weakness. She denies any other complaints. No past medical history on file. No past surgical history on file. No family history on file. Social History     Socioeconomic History    Marital status: SINGLE     Spouse name: Not on file    Number of children: Not on file    Years of education: Not on file    Highest education level: Not on file   Occupational History    Not on file   Tobacco Use    Smoking status: Not on file    Smokeless tobacco: Not on file   Substance and Sexual Activity    Alcohol use: Not on file    Drug use: Not on file    Sexual activity: Not on file   Other Topics Concern    Not on file   Social History Narrative    Not on file     Social Determinants of Health     Financial Resource Strain:     Difficulty of Paying Living Expenses: Not on file   Food Insecurity:     Worried About Running Out of Food in the Last Year: Not on file    Juan Antonio of Food in the Last Year: Not on file   Transportation Needs:     Lack of Transportation (Medical): Not on file    Lack of Transportation (Non-Medical):  Not on file   Physical Activity:     Days of Exercise per Week: Not on file    Minutes of Exercise per Session: Not on file   Stress:     Feeling of Stress : Not on file   Social Connections:     Frequency of Communication with Friends and Family: Not on file    Frequency of Social Gatherings with Friends and Family: Not on file    Attends Scientologist Services: Not on file    Active Member of Clubs or Organizations: Not on file    Attends Club or Organization Meetings: Not on file    Marital Status: Not on file   Intimate Partner Violence:     Fear of Current or Ex-Partner: Not on file    Emotionally Abused: Not on file    Physically Abused: Not on file    Sexually Abused: Not on file   Housing Stability:     Unable to Pay for Housing in the Last Year: Not on file    Number of [place] in the Last Year: Not on file    Unstable Housing in the Last Year: Not on file         ALLERGIES: Fentanyl    Review of Systems   Constitutional: Negative for chills and fever. HENT: Negative for rhinorrhea and sore throat. Respiratory: Negative for cough and shortness of breath. Cardiovascular: Negative for chest pain. Gastrointestinal: Negative for abdominal pain, diarrhea, nausea and vomiting. Genitourinary: Negative for dysuria and urgency. Musculoskeletal:        Hip pain   Skin: Negative for rash. Neurological: Negative for dizziness, weakness and light-headedness. Vitals:    11/25/21 1902   BP: (!) 217/65   Pulse: 66   Resp: (!) 65   Temp: 97.8 °F (36.6 °C)   SpO2: 95%   Weight: 54.4 kg (120 lb)   Height: 4' 11\" (1.499 m)            Physical Exam     Vital signs reviewed. Nursing notes reviewed.     Const:  No acute distress, well developed, well nourished  Head:  Atraumatic, normocephalic  Eyes:  PERRL, conjunctiva normal, no scleral icterus  Neck:  Supple, trachea midline  Cardiovascular: Regular rate  Resp:  No resp distress, no increased work of breathing  Abd:  Soft, non-tender, non-distended  MSK: Tenderness palpation of the right hip, pain with range of motion of the right hip, no L-spine tenderness to palpation  Neuro:  Alert and oriented x3, no cranial nerve defect  Skin:  Warm, dry, intact  Psych: normal mood and affect, behavior is normal, judgement and thought content is normal          MDM  Number of Diagnoses or Management Options     Amount and/or Complexity of Data Reviewed  Clinical lab tests: ordered and reviewed  Tests in the radiology section of CPT®: ordered and reviewed  Review and summarize past medical records: yes    Patient Progress  Patient progress: stable          Perfect Serve Consult for Admission  8:59 PM    ED Room Number: ER01/01  Patient Name and age:  Yue Melchor 80 y.o.  female  Working Diagnosis:   1. Closed fracture of multiple pubic rami, right, initial encounter ([facility] [address]    2. Closed nondisplaced fracture of right acetabulum, unspecified portion of acetabulum, initial encounter ([facility] [address]        COVID-19 Suspicion:  no  Sepsis present:  no  Reassessment needed: no  Readmission: no  Isolation Requirements:  no  Recommended Level of Care:  med/surg  Department:[facility] ED - [phone]  Other:  Ortho has been consulted    Ms. Ange Chao is an 79yo female who presents to the ER after a fall and with hip pain. Xray shows fx. Pt's fall was mechanical. Images seen by ortho. Pt.  To be evaluated for admission by the hospitalist.      Procedures

## 2021-11-26 NOTE — ED NOTES
TRANSFER - OUT REPORT:    Verbal report given to RN(name) on Renetta Ward  being transferred to 416(unit) for routine progression of care       Report consisted of patients Situation, Background, Assessment and   Recommendations(SBAR). Information from the following report(s) SBAR, ED Summary, [facility] and Recent Results was reviewed with the receiving nurse. Lines:   Peripheral IV 11/25/21 Left Antecubital (Active)        Opportunity for questions and clarification was provided.       Patient transported with:   [employer]

## 2021-11-26 NOTE — PROGRESS NOTES
Primary Nurse Lurdes Phelan and Heydi Wong RN performed a dual skin assessment on this patient No impairment noted  Gatito score is 16

## 2021-11-26 NOTE — PROGRESS NOTES
Problem: Self Care Deficits Care Plan (Adult)  Goal: *Acute Goals and Plan of Care (Insert Text)  Description:   FUNCTIONAL STATUS PRIOR TO ADMISSION: Patient was independent and active without use of DME.     HOME SUPPORT: The patient lived alone with daughter to provide assistance. Occupational Therapy Goals  Initiated 11/26/2021  1. Patient will perform self-feeding with supervision/set-up seated edge of bed or in chair within 7 day(s). 2.  Patient will perform upper body dressing with minimal assistance and best technique within 7 day(s). 3.  Patient will perform grooming with supervision/set-up seated edge of bed within 7 day(s). 4.  Patient will perform static standing  with bilateral UE support with moderate assistance x's 2 for > or = 1 minute  within 7 day(s). 5.   Patient will participate in upper extremity therapeutic exercise/activities with supervision/set-up for 10 minutes within 7 day(s). 6.  Patient will perform pursed lip breathing during functional mobility with min cues within 7 day(s). Outcome: Not Met       OCCUPATIONAL THERAPY EVALUATION  Patient: Lauren Carranza (55 y.o. female)  Date: 11/26/2021  Primary Diagnosis: Closed fracture of multiple pubic rami (HCC) [S32.599A]        Precautions:   Fall, PWB    ASSESSMENT  Based on the objective data described below, the patient presents with decreased activity tolerance, increased anxiety and fear of all movement due to trauma of falling and pain. Patient very independent at baseline, many questions re: plan of care, healing, what's ok to do and if she will heal. This date required increased time, max encouragement and step by step cues to facilitate sitting in bed with head of bed elevated sot she could eat/drink. Unable to sit edge of bed yet. Discussed with PT and  and feel she would benefit from rehab if pain, anxiety and fear of movement improve. .    Current Level of Function Impacting Discharge (ADLs/self-care): total care LE ADLs, toileting, unable to sit up yet    Functional Outcome Measure: The patient scored 25/100 on the Barthel Index outcome measure which is indicative of very dependent. Other factors to consider for discharge: was independent and living alone     Patient will benefit from skilled therapy intervention to address the above noted impairments. PLAN :  Recommendations and Planned Interventions: self care training, functional mobility training, therapeutic exercise, balance training, therapeutic activities, endurance activities, patient education, home safety training, and family training/education    Frequency/Duration: Patient will be followed by occupational therapy 5 times a week to address goals. Recommendation for discharge: (in order for the patient to meet his/her long term goals)  Therapy 3 hours per day 5-7 days per week versus SNF    This discharge recommendation:  Has been made in collaboration with the attending provider and/or case management    IF patient discharges home will need the following DME: none in this setting       SUBJECTIVE:   Patient stated Oh, I did it again.  re: tensing up her whole body during/after moving, pain did subside after minimal movement    OBJECTIVE DATA SUMMARY:   HISTORY:   No past medical history on file. No past surgical history on file. Expanded or extensive additional review of patient history:     Home Situation  Home Environment: Private residence  # Steps to Enter: 0  One/Two Story Residence: One story  Living Alone: Yes  Support Systems: Other Family Member(s)  Patient Expects to be Discharged to[de-identified] [place]  Current DME Used/Available at Home: None    Hand dominance: Right    EXAMINATION OF PERFORMANCE DEFICITS:  Cognitive/Behavioral Status:  Neurologic State: Alert  Orientation Level: Oriented X4  Cognition: Appropriate decision making; Appropriate for age attention/concentration;  Follows commands  Perception: Appears intact  Perseveration: Perseverates during conversation  Safety/Judgement: Awareness of environment; Fall prevention; Home safety; Insight into deficits    Skin: not formally assessed    Edema: none noted distal LE's/UE    Hearing: Auditory  Auditory Impairment: None    Vision/Perceptual:                                     Range of Motion:  AROM: Within functional limits bilateral UE                         Strength:  Strength: Within functional limits bilateral UE's                Coordination:  Coordination: Within functional limits  Fine Motor Skills-Upper: Left Intact; Right Intact    Gross Motor Skills-Upper: Left Intact; Right Intact    Tone & Sensation:  Tone: Normal  Sensation: Intact                      Balance:  Sitting: Impaired (unable to tolerate)  Standing:  (unable)    Functional Mobility and Transfers for ADLs:  Bed Mobility:  Rolling: Total assistance; Assist x2  Supine to Sit: Total assistance (unable to tolerate)  Scooting: Total assistance; Assist x2    Transfers:  Sit to Stand: Total assistance (unable)  Bed to Chair: Total assistance (unable)  Toilet Transfer : Total assistance    ADL Assessment:  Feeding: Setup; Additional time; Other (comment) (in supine with head of bed elevated)    Oral Facial Hygiene/Grooming: Setup (bed level)    Bathing: Total assistance    Upper Body Dressing: Total assistance    Lower Body Dressing: Total assistance    Toileting: Total assistance                ADL Intervention and task modifications:             Educated patient and her daughter on role of OT, plan of care in hospital, benefit of movement.      Instructed on positioning of right LE at rest in supine, locked foot of bed to prevent increased pressure on sacral area, instructed on placement of pillows between LE's for rolling, instructed patient to inform caregivers of how to position pillows     Instructed on use of call bell    Required increased time and max cues for repositioning at head of bed, and slowly elevating head of bed so patient could eat. Educated on need to sit up to eat, patient demonstrating pain, fear of all movement and anxiety. With increased time, max step by step cues able to elevate head of bed to ~60 degrees and patient set up to eat breakfast,     Cognitive Retraining  Safety/Judgement: Awareness of environment; Fall prevention; Home safety; Insight into deficits    Therapeutic Exercise: Instructed on ankle pumps with max verbal and physical cues, instructed on bilateral UE AROM and cervical ROM in bed   Functional Measure:    Barthel Index:  Bathin  Bladder: 10  Bowels: 10  Groomin  Dressin  Feedin  Mobility: 0  Stairs: 0  Toilet Use: 0  Transfer (Bed to Chair and Back): 0  Total: 25/100      The Barthel ADL Index: Guidelines  1. The index should be used as a record of what a patient does, not as a record of what a patient could do. 2. The main aim is to establish degree of independence from any help, physical or verbal, however minor and for whatever reason. 3. The need for supervision renders the patient not independent. 4. A patient's performance should be established using the best available evidence. Asking the patient, friends/relatives and nurses are the usual sources, but direct observation and common sense are also important. However direct testing is not needed. 5. Usually the patient's performance over the preceding 24-48 hours is important, but occasionally longer periods will be relevant. 6. Middle categories imply that the patient supplies over 50 per cent of the effort. 7. Use of aids to be independent is allowed. Score Interpretation (from [address] [address])    Independent   60-79 Minimally independent   40-59 Partially dependent   20-39 Very dependent   <20 Totally dependent     -Hal Uribe., Barthel, D.W. (1965). Functional evaluation: the Barthel Index. [address] [facility] ([address] (1997).  The Barthel activities of daily living index: self-reporting versus actual performance in the old (> or = 75 years). Journal of [facility] 45(9), [facility], ELENAF, Juan Austin., Vic Boyd. (1999). Measuring the change in disability after inpatient rehabilitation; comparison of the responsiveness of the Barthel Index and Functional [place] Measure. Journal of Neurology, Neurosurgery, and Psychiatry, 66(4), 026-811. EdJORGE Bartholomew, NAVID Laurent, & Nick Trejo M.A. (2004) Assessment of post-stroke quality of life in cost-effectiveness studies: The usefulness of the Barthel Index and the EuroQoL-5D. Quality of Life Research, 15, 012-32         Occupational Therapy Evaluation Charge Determination   History Examination Decision-Making   LOW Complexity : Brief history review  MEDIUM Complexity : 3-5 performance deficits relating to physical, cognitive , or psychosocial skils that result in activity limitations and / or participation restrictions HIGH Complexity : Patient presents with comorbidities that affect occupational performance. Signifigant modification of tasks or assistance (eg, physical or verbal) with assessment (s) is necessary to enable patient to complete evaluation       Based on the above components, the patient evaluation is determined to be of the following complexity level: LOW   Pain Rating:  Not rated, significant pain with all movement,     Activity Tolerance:   Poor, limited by pain and anxiety/fear of movement    After treatment patient left in no apparent distress:    Supine in bed, Call bell within reach, Caregiver / family present, Side rails x 3, and eating breakfast and smiling after tx    COMMUNICATION/EDUCATION:   The patients plan of care was discussed with: Physical therapist, Registered nurse, and Case management. Home safety education was provided and the patient/caregiver indicated understanding.  and Patient/family have participated as able in goal setting and plan of care. This patients plan of care is appropriate for delegation to [facility].     Thank you for this referral.  Brigitte Montaño, OTR/L  Time Calculation: 48 mins

## 2021-11-26 NOTE — CONSULTS
Orthopedic Perfect Serve Consult Note    Date of Consultation:  November 25, 2021  Referring Physician:  Tarun Nash    Pt found to have R sup/inf pubic rami fx with acetabular fracture in ED; No other injuries and NVI per ED provider; Will see in the morning for formal consult note. - This patient presented to facility this afternoon and has been admitted to medicine service for PT/OT and rehab placement secondary to injuries. This fracture is a non operative fracture and Ortho has advised Protected WB (50% WB) with walker, pain management as per orders.      Clovis Epley, NP  Orthopedic Trauma Service

## 2021-11-26 NOTE — PROGRESS NOTES
Problem: Falls - Risk of  Goal: *Absence of Falls  Description: Document Imtiaz Adair Fall Risk and appropriate interventions in the flowsheet.   Outcome: Progressing Towards Goal  Note: Fall Risk Interventions:  Mobility Interventions: Bed/chair exit alarm, Strengthening exercises (ROM-active/passive), Utilize walker, cane, or other assistive device         Medication Interventions: Bed/chair exit alarm, Patient to call before getting OOB, Teach patient to arise slowly    Elimination Interventions: Bed/chair exit alarm, Call light in reach, Toilet paper/wipes in reach
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
## 2021-11-26 NOTE — PROGRESS NOTES
Problem: Mobility Impaired (Adult and Pediatric)  Goal: *Acute Goals and Plan of Care (Insert Text)  Description: FUNCTIONAL STATUS PRIOR TO ADMISSION: Patient was independent and active without use of DME.    HOME SUPPORT PRIOR TO ADMISSION: The patient lived with alone, very supportive family. Physical Therapy Goals  Initiated 11/26/2021  1. Patient will move from supine to sit and sit to supine  in bed with maximal assistance within 7 day(s). 2.  Patient will transfer from bed to chair and chair to bed with maximal assistance using the least restrictive device within 7 day(s). 3.  Patient will perform sit to stand with maximal assistance within 7 day(s). 4.  Patient will ambulate with maximal assistance for 15 feet with the least restrictive device within 7 day(s). Outcome: Progressing Towards Goal  Note:   PHYSICAL THERAPY EVALUATION  Patient: Jairon Parks (89 y.o. female)  Date: 11/26/2021  Primary Diagnosis: Closed fracture of multiple pubic rami (HCC) [S32.599A]        Precautions:   Fall, PWB      ASSESSMENT  Based on the objective data described below, the patient presents with increased pain, fear of movement, and decreased strength and ROM following a ground level fall with resulting Right superior and inferior pubic rami fractures and an acetabular fractures. Patient today received supine in bed, extensive time required due to patient hard of hearing and increased anxiety/fear of movement. Patient only able to tolerate rolling and repositioning in bed. Took increased time to prop head of bed elevated enough for her to eat. Patient was very independent prior, and with encouragement she was able to do more than her fear allowed, may progress in the next few days. Rehab at discharge. Current Level of Function Impacting Discharge (mobility/balance): Total A x2 for rolling and     Functional Outcome Measure:   The patient scored Total: 25/100 on the Barthel Index outcome measure. Other factors to consider for discharge:      Patient will benefit from skilled therapy intervention to address the above noted impairments. PLAN :  Recommendations and Planned Interventions: bed mobility training, transfer training, gait training, therapeutic exercises, and therapeutic activities      Frequency/Duration: Patient will be followed by physical therapy:  daily to address goals. Recommendation for discharge: (in order for the patient to meet his/her long term goals)  Therapy 3 hours per day 5-7 days per week    This discharge recommendation:  A follow-up discussion with the attending provider and/or case management is planned    IF patient discharges home will need the following DME: to be determined (TBD)         SUBJECTIVE:   Patient stated Nicolette Serve this ever get any better.     OBJECTIVE DATA SUMMARY:   HISTORY:    No past medical history on file. No past surgical history on file. Personal factors and/or comorbidities impacting plan of care: see above    Home Situation  Home Environment: Private residence  # Steps to Enter: 0  One/Two Story Residence: One story  Living Alone: Yes  Support Systems: Other Family Member(s)  Patient Expects to be Discharged to[de-identified] House  Current DME Used/Available at Home: None    EXAMINATION/PRESENTATION/DECISION MAKING:   Critical Behavior:  Neurologic State: Alert  Orientation Level: Oriented X4  Cognition: Appropriate decision making, Appropriate for age attention/concentration, Follows commands  Safety/Judgement: Awareness of environment, Fall prevention, Home safety, Insight into deficits  Hearing: Auditory  Auditory Impairment: None    Range Of Motion:  AROM: Within functional limits            LE propped on pillows and prevalon boots on bilateral LE           Strength:    Strength:  Within functional limits                    Tone & Sensation:   Tone: Normal              Sensation: Intact               Coordination:  Coordination: Within functional limits  Vision:      Functional Mobility:  Bed Mobility:  Rolling: Total assistance; Assist x2  Supine to Sit: Total assistance (unable to tolerate)     Scooting: Total assistance; Assist x2  Transfers:  Sit to Stand: Total assistance (unable)           Bed to Chair: Total assistance (unable)              Balance:   Sitting: Impaired (unable to tolerate)  Standing:  (unable)  Ambulation/Gait Training:   unable              Functional Measure:  Barthel Index:    Bathin  Bladder: 10  Bowels: 10  Groomin  Dressin  Feedin  Mobility: 0  Stairs: 0  Toilet Use: 0  Transfer (Bed to Chair and Back): 0  Total: 25/100            Physical Therapy Evaluation Charge Determination   History Examination Presentation Decision-Making   HIGH Complexity :3+ comorbidities / personal factors will impact the outcome/ POC  HIGH Complexity : 4+ Standardized tests and measures addressing body structure, function, activity limitation and / or participation in recreation  HIGH Complexity : Unstable and unpredictable characteristics  Other outcome measures Barthel index  HIGH       Based on the above components, the patient evaluation is determined to be of the following complexity level: HIGH     Pain Rating:  10/10- premedicated prior to session    Activity Tolerance:   Poor    After treatment patient left in no apparent distress:   Supine in bed, Call bell within reach, and Bed / chair alarm activated    COMMUNICATION/EDUCATION:   The patients plan of care was discussed with: Occupational therapist and Registered nurse. Fall prevention education was provided and the patient/caregiver indicated understanding., Patient/family have participated as able in goal setting and plan of care. , and Patient/family agree to work toward stated goals and plan of care.     Thank you for this referral.  Dale Reyna, PT, DPT   Time Calculation: 33 mins
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
## 2021-11-26 NOTE — H&P
[facility] [place] [facility]  HISTORY AND PHYSICAL    Name:  Yoseph Wallace  MR#:  [id number]  :  1932  ACCOUNT #:  [de-identified]  ADMIT DATE:  2021      The patient was seen, evaluated, and admitted by me on 2021. PRIMARY CARE PHYSICIAN:  Unknown. SOURCE OF INFORMATION:  The patient and review of ED electronic medical records. CHIEF COMPLAINT:  Fall. HISTORY OF PRESENT ILLNESS:  This is an 80-year-old woman with past medical history  significant for hypertension, dyslipidemia, and anxiety disorder was in her usual state of health until the day of her presentation at the emergency room when the patient fell. The patient stated that she was coming back to her apartment from Thanksgiving dinner. She was carrying leftover food in a box in her hands. She lost her balance and fell backwards while carrying the box of leftover food. The patient denies headache, dizziness, or blurring of vision prior to the fall and after the fall. The patient developed left hip pain following the fall and was unable to ambulate on her own; a neighbor  saw the patient and called 911, and then the patient was brought to the emergency room for further evaluation. The pain in the right hip is sharp, constant, and worse with any movement involving the right hip, slight relief when the patient is at rest, 8/10 in severity. When the patient arrived at the emergency room, imaging studies revealed fracture of multiple pubic rami as well as nondisplaced fracture of right acetabulum. The emergency room physician consulted orthopedic service on call. According to the orthopedic temporary note, the patient has an inoperable fracture. Conservative treatment was advised. The patient was then referred to the hospitalist service for evaluation for admission. No record of prior admission to this hospital.    PAST MEDICAL HISTORY:  1. Anxiety disorder. 2.  Dyslipidemia.   3. Hypertension. ALLERGIES:  THE PATIENT IS ALLERGIC TO FENTANYL. FAMILY HISTORY:  Her father  of pneumonia at the age of 62. Her mother had stomach cancer. MEDICATIONS:  The patient is not able to provide the list of home medication at this time. PAST SURGICAL HISTORY:  This is significant for cholecystectomy. SOCIAL HISTORY:  No history of alcohol or tobacco abuse. REVIEW OF SYSTEMS:  HEAD, EYES, EARS, NOSE AND THROAT:  No headache, no dizziness, no blurring of vision, and no photophobia. RESPIRATORY SYSTEM:  No cough, no shortness of breath, and no hemoptysis. CARDIOVASCULAR SYSTEM:  No chest pain, no orthopnea, and no palpitation. GASTROINTESTINAL SYSTEM:  No nausea or vomiting, no diarrhea, and no constipation. GENITOURINARY SYSTEM:  No dysuria, no urgency, and no frequency. All other systems are reviewed and they are negative. PHYSICAL EXAMINATION:  GENERAL APPEARANCE:  The patient appeared ill and in moderate distress. VITAL SIGNS:  On arrival at the emergency room; temperature 97.8, pulse at 66, respiratory rate 55, blood pressure 217/65, and oxygen saturation 95% on room air. HEAD:  Normocephalic, atraumatic. EYES:  Normal eye movement. No redness, no drainage, and no discharge. EARS:  Normal external ears with no obvious drainage. NOSE:  No deformity and no drainage. MOUTH AND THROAT:  No visible oral lesion. NECK:  Supple. Mild tenderness in the back of the neck. No JVD and no thyromegaly. CHEST:  Clear breath sounds. No wheezing and no crackles. HEART:  Normal S1 and S2, regular. No clinically appreciable murmur. ABDOMEN:  Soft and nontender. Normal bowel sounds. CNS:  Alert and oriented x3. No gross focal neurological deficit. EXTREMITIES:  No edema. Pulses 2+ bilaterally. MUSCULOSKELETAL SYSTEM:  No obvious joint deformity and swelling. SKIN:  No active skin lesions seen in the exposed part of the body. PSYCHIATRY:  Normal mood and affect.   LYMPHATIC SYSTEM:  No cervical lymphadenopathy. DIAGNOSTIC DATA:  The EKG shows sinus rhythm and nonspecific ST and T waves abnormalities. Chest x-ray, no acute infiltrate. X-ray of the right femur, no acute abnormalities. X-ray of the right hip shows fractured pelvis on the right. X-ray of the lumbar spine, no acute fracture. The CT scan of the pelvis without contrast shows right pelvic fractures. LABORATORY DATA:  Hematology; WBC 14.0, hemoglobin 14.0, hematocrit 45.0, platelets 912. Chemistry; sodium 132, potassium 4.2, chloride 98, CO2 is 27, glucose 147, BUN 15, creatinine 1.05, and calcium 9.0. Total bilirubin 0.6. ALT not available, AST 31, alkaline phosphatase 99, total protein 7.5, and albumin level 3.4. Globulin 4.1.    ASSESSMENT:  1.  Closed fracture of multiple right pubic rami. 2.  Closed nondisplaced fracture of right acetabulum. 3.  Hypertension. 4.  Hyponatremia. 5.  Hyperglycemia. 6.  Leukocytosis. 7.  Dyslipidemia. 8.  Anxiety disorder. 9.  Fall. PLAN:  1. Closed fracture of multiple right pubic rami. We will admit the patient for pain control. We will request PT/OT evaluation and treatment. We will await further recommendation from the orthopedic service consulted by the emergency room physician. 2.  Closed nondisplaced fracture of right acetabulum. We will also carry out pain control while awaiting further recommendation from the orthopedic service. We will also request PT/OT evaluation and treatment. 3.  Hypertension. The patient cannot state the name of antihypertensive medication. Blood pressure was markedly elevated in the emergency room, part of this could be due to the pain from the fractured pelvis. We will place the patient on hydralazine as needed. We will check TSH level. We will check cardiac markers. 4.  Hyponatremia. This is most likely due to volume depletion. We will carry out fluid therapy and repeat sodium level. 5.  Hyperglycemia.   The patient denies history of diabetes. We will check hemoglobin A1c level. 6.  Leukocytosis. The patient is afebrile and not toxic. We will check urinalysis. Chest x-ray is without acute pathology. 7.  Dyslipidemia. We will continue with dietary therapy. We will resume preadmission medication once the list of home medication is obtained and verified by the pharmacist.  8.  Anxiety disorder. We will continue with preadmission medication once the list of home medication is obtained and verified by the pharmacist.  9.  Fall. This is on elderly woman who fell and sustained fracture of the pelvis. She has mild tenderness in the neck. The patient is at risk for multiple fractures and injuries. Because of that, we will obtain CT scan of the head to rule out acute pathology. We will check CT scan of the cervical spine to evaluate the patient for fracture. We will also obtain CT scan of the chest and abdomen to evaluate the patient for fracture and intraabdominal injury. 10.  Other issues. Code status, the patient is a full code. We will place the patient on heparin for DVT prophylaxis. FUNCTIONAL STATUS PRIOR TO ADMISSION:  The patient came from home. The patient is ambulatory with no assistive device. COVID PRECAUTION:  The patient was wearing a face mask. I was wearing a face mask and gloves for this patient's encounter.       MD PLACIDO [email or web address]  D:  11/26/2021 3:33  T:  11/26/2021 5:29  JOB #:  [id number]

## 2021-11-26 NOTE — PROGRESS NOTES
Orders received, chart reviewed and patient evaluated by physical therapy. Pending progression with skilled acute physical therapy, recommend: To be determined: IPR vs SNF         Full evaluation to follow.    Alexandra Galdamez PT,DPT,NCS

## 2021-11-27 PROCEDURE — 77030038269 HC DRN EXT URIN PURWCK BARD -A

## 2021-11-27 PROCEDURE — 65660000000 HC RM CCU STEPDOWN

## 2021-11-27 PROCEDURE — 97116 GAIT TRAINING THERAPY: CPT

## 2021-11-27 PROCEDURE — 74011250637 HC RX REV CODE- 250/637: Performed by: INTERNAL MEDICINE

## 2021-11-27 PROCEDURE — 97530 THERAPEUTIC ACTIVITIES: CPT

## 2021-11-27 PROCEDURE — 74011250636 HC RX REV CODE- 250/636: Performed by: INTERNAL MEDICINE

## 2021-11-27 RX ORDER — LEVOTHYROXINE SODIUM 75 UG/1
75 TABLET ORAL
COMMUNITY

## 2021-11-27 RX ORDER — HYDROCHLOROTHIAZIDE 12.5 MG/1
12.5 CAPSULE ORAL DAILY
COMMUNITY
End: 2021-12-01

## 2021-11-27 RX ORDER — METOPROLOL SUCCINATE 50 MG/1
50 TABLET, EXTENDED RELEASE ORAL DAILY
COMMUNITY
End: 2022-01-28

## 2021-11-27 RX ORDER — SERTRALINE HYDROCHLORIDE 50 MG/1
50 TABLET, FILM COATED ORAL DAILY
COMMUNITY
End: 2022-04-09

## 2021-11-27 RX ORDER — LEVOTHYROXINE SODIUM 75 UG/1
75 TABLET ORAL
Status: DISCONTINUED | OUTPATIENT
Start: 2021-11-28 | End: 2021-12-01 | Stop reason: HOSPADM

## 2021-11-27 RX ORDER — METOPROLOL SUCCINATE 50 MG/1
50 TABLET, EXTENDED RELEASE ORAL DAILY
Status: DISCONTINUED | OUTPATIENT
Start: 2021-11-28 | End: 2021-12-01 | Stop reason: HOSPADM

## 2021-11-27 RX ORDER — SERTRALINE HYDROCHLORIDE 50 MG/1
50 TABLET, FILM COATED ORAL DAILY
Status: DISCONTINUED | OUTPATIENT
Start: 2021-11-28 | End: 2021-11-27

## 2021-11-27 RX ORDER — SERTRALINE HYDROCHLORIDE 50 MG/1
50 TABLET, FILM COATED ORAL
Status: DISCONTINUED | OUTPATIENT
Start: 2021-11-27 | End: 2021-12-01 | Stop reason: HOSPADM

## 2021-11-27 RX ADMIN — HEPARIN SODIUM 5000 UNITS: 5000 INJECTION INTRAVENOUS; SUBCUTANEOUS at 22:36

## 2021-11-27 RX ADMIN — SODIUM CHLORIDE, POTASSIUM CHLORIDE, SODIUM LACTATE AND CALCIUM CHLORIDE 75 ML/HR: 600; 310; 30; 20 INJECTION, SOLUTION INTRAVENOUS at 08:48

## 2021-11-27 RX ADMIN — HEPARIN SODIUM 5000 UNITS: 5000 INJECTION INTRAVENOUS; SUBCUTANEOUS at 14:25

## 2021-11-27 RX ADMIN — HEPARIN SODIUM 5000 UNITS: 5000 INJECTION INTRAVENOUS; SUBCUTANEOUS at 05:36

## 2021-11-27 RX ADMIN — MORPHINE SULFATE 2 MG: 2 INJECTION, SOLUTION INTRAMUSCULAR; INTRAVENOUS at 08:46

## 2021-11-27 RX ADMIN — Medication 10 ML: at 13:42

## 2021-11-27 RX ADMIN — ONDANSETRON 4 MG: 2 INJECTION INTRAMUSCULAR; INTRAVENOUS at 05:42

## 2021-11-27 RX ADMIN — MORPHINE SULFATE 2 MG: 2 INJECTION, SOLUTION INTRAMUSCULAR; INTRAVENOUS at 05:36

## 2021-11-27 RX ADMIN — SODIUM CHLORIDE, POTASSIUM CHLORIDE, SODIUM LACTATE AND CALCIUM CHLORIDE 75 ML/HR: 600; 310; 30; 20 INJECTION, SOLUTION INTRAVENOUS at 16:08

## 2021-11-27 RX ADMIN — SERTRALINE 50 MG: 50 TABLET, FILM COATED ORAL at 22:35

## 2021-11-27 RX ADMIN — Medication 10 ML: at 22:27

## 2021-11-27 RX ADMIN — Medication 10 ML: at 05:41

## 2021-11-27 NOTE — PROGRESS NOTES
[facility]  [address] [facility], [place], [address] [place] [address]  [phone]      Medical Progress Note      NAME: Cammy Gallo   :  1932  MRM:  [id number]    Date/Time of service: 2021  9:31 AM       Subjective:     Chief Complaint:  fall    Patient laying in bed, states \"rehab this morning went well. \" She is in good spirits. No complaints currently. Objective:       Vitals:       Last 24hrs VS reviewed since prior progress note.  Most recent are:    Visit Vitals  BP (!) 172/60 (BP 1 Location: Left upper arm, BP Patient Position: At rest)   Pulse 72   Temp 98.1 °F (36.7 °C)   Resp 18   Ht 4' 11\" (1.499 m)   Wt 54.4 kg (120 lb)   SpO2 92%   BMI 24.24 kg/m²     SpO2 Readings from Last 6 Encounters:   21 92%            Intake/Output Summary (Last 24 hours) at 2021 7260  Last data filed at 2021 2145  Gross per 24 hour   Intake 290 ml   Output 250 ml   Net 40 ml        Exam:     Physical Exam:    Gen:  Well-developed, well-nourished, in no acute distress  HEENT:  Pink conjunctivae, PERRL, hearing intact to voice, moist mucous membranes  Neck:  Supple, without masses, thyroid non-tender  Resp:  No accessory muscle use, clear breath sounds without wheezes rales or rhonchi  Card:  No murmurs, normal S1, S2 without thrills, bruits or peripheral edema  Abd:  Soft, non-tender, non-distended, normoactive bowel sounds are present, no palpable organomegaly and no detectable hernias  Musc:  No cyanosis or clubbing  Skin:  No rashes or ulcers, skin turgor is good  Neuro:  Nonfocal, moves extremities although limited due to fractures, follows commands  Psych:  Good insight, oriented to person, place and time, alert      Medications Reviewed: (see below)    Lab Data Reviewed: (see below)    ______________________________________________________________________    Medications:     Current Facility-Administered Medications   Medication Dose Route Frequency    sodium chloride (NS) flush 5-40 mL  5-40 mL IntraVENous Q8H    sodium chloride (NS) flush 5-40 mL  5-40 mL IntraVENous PRN    acetaminophen (TYLENOL) tablet 650 mg  650 mg Oral Q6H PRN    Or    acetaminophen (TYLENOL) suppository 650 mg  650 mg Rectal Q6H PRN    polyethylene glycol (MIRALAX) packet 17 g  17 g Oral DAILY PRN    ondansetron (ZOFRAN ODT) tablet 4 mg  4 mg Oral Q8H PRN    Or    ondansetron (ZOFRAN) injection 4 mg  4 mg IntraVENous Q6H PRN    heparin (porcine) injection 5,000 Units  5,000 Units SubCUTAneous Q8H    oxyCODONE-acetaminophen (PERCOCET) 5-325 mg per tablet 1 Tablet  1 Tablet Oral Q4H PRN    morphine injection 2 mg  2 mg IntraVENous Q4H PRN    lactated Ringers infusion  75 mL/hr IntraVENous CONTINUOUS    hydrALAZINE (APRESOLINE) 20 mg/mL injection 10 mg  10 mg IntraVENous Q6H PRN          Lab Review:     Recent Labs     11/26/21  0153 11/25/21 1949   WBC 10.8 14.0*   HGB 13.2 14.9   HCT 40.7 45.0    208     Recent Labs     11/26/21  0153 11/25/21 1949   * 132*   K 3.6 4.2   CL 98 98   CO2 27 27   * 147*   BUN 13 15   CREA 0.94 1.05*   CA 8.7 9.0   MG 1.8  --    PHOS 3.0  --    ALB 3.1* 3.4*   TBILI 0.5 0.6   ALT 18 UNABLE TO OBTAIN ACCURATE RESULTS DUE TO GROSS LIPEMIA     No results found for: GLUCPOC       Assessment / Plan:     1. Multiple pubic rami fractures, nondisplaced R acetabulum fracture - PT/OT, cons tx; pain control, appreciate ortho recs; outpatient f/u 1-2 wks with Dr Cueto Section  2. Fall - prior to admission; c-spine CT neg for fx  3. Leukocytosis - suspect reactive from fall, fracture  4. Hyponatremia - mild; zoloft restarted, hctz held  5. Anxiety  6. HTN  7. VTE prophylaxis - heparin  8.  Dispo - PT/OT rec rehab vs SNF at [facility]      Attending Physician: Petra Cross DO

## 2021-11-27 NOTE — PROGRESS NOTES
Problem: Mobility Impaired (Adult and Pediatric)  Goal: *Acute Goals and Plan of Care (Insert Text)  Description: FUNCTIONAL STATUS PRIOR TO ADMISSION: Patient was independent and active without use of DME.    HOME SUPPORT PRIOR TO ADMISSION: The patient lived with alone, very supportive family. Physical Therapy Goals  Initiated 11/26/2021  1. Patient will move from supine to sit and sit to supine  in bed with maximal assistance within 7 day(s). 2.  Patient will transfer from bed to chair and chair to bed with maximal assistance using the least restrictive device within 7 day(s). 3.  Patient will perform sit to stand with maximal assistance within 7 day(s). 4.  Patient will ambulate with maximal assistance for 15 feet with the least restrictive device within 7 day(s). Outcome: Progressing Towards Goal  Note:   PHYSICAL THERAPY TREATMENT  Patient: Carlie Woods (89 y.o. female)  Date: 11/27/2021  Diagnosis: Closed fracture of multiple pubic rami (HCC) [S32.599A]   Closed fracture of multiple pubic rami (HCC)       Precautions: Fall, PWB  Chart, physical therapy assessment, plan of care and goals were reviewed. ASSESSMENT  Patient continues with skilled PT services and is progressing towards goals. Patient premedicated prior to session. She was able to tolerate increased activity today. Requires increased time and cuing throughout due to fear of movement and anxiety (still waiting on med rec for her anxiety medications she normally takes daily- daughter just brought today). She was able to perform AAROM LE exercises in supine and sitting. She was able to tolerate static sitting at edge of bed, and upright activity. She maintained PWB due to pain, in standing and able ot take short steps for limited distance of 3 feet forward/backward and sidestepping  before returning to supine. Patient continues to progress will benefit from rehab at discharge. Blood pressure elevated throughout- see below. Arron Javed Current Level of Function Impacting Discharge (mobility/balance): MAX A x2-Total A x2 for bed mobility, MOD A x2 for sit-stand and short distance ambulation with RW    Other factors to consider for discharge:          PLAN :  Patient continues to benefit from skilled intervention to address the above impairments. Continue treatment per established plan of care. to address goals. Recommendation for discharge: (in order for the patient to meet his/her long term goals)  Therapy up to 5 days/week in rehab setting    This discharge recommendation:  A follow-up discussion with the attending provider and/or case management is planned    IF patient discharges home will need the following DME: to be determined (TBD)       SUBJECTIVE:   Patient stated Jeremy De Oliveira, that actually did not hurt.     OBJECTIVE DATA SUMMARY:   Critical Behavior:  Neurologic State: Alert  Orientation Level: Oriented X4 (confused at times)  Cognition: Appropriate decision making  Safety/Judgement: Awareness of environment, Fall prevention, Home safety, Insight into deficits  Vitals:    11/27/21 0409 11/27/21 0808 11/27/21 0900 11/27/21 1026   BP: (!) 148/71 (!) 172/60 (!) 175/72 (!) 169/72   BP 1 Location: Right arm Left upper arm Right arm Right arm   BP Patient Position: At rest At rest At rest; Supine Comment: post activity   Pulse: 68 72 71    Temp: 97.4 °F (36.3 °C) 98.1 °F (36.7 °C)     Resp: 18 18     Height:       Weight:       SpO2: 92% 92% 92%        Functional Mobility Training:  Bed Mobility:  Rolling: Maximum assistance; Assist x2  Supine to Sit: Maximum assistance; Assist x2  Sit to Supine: Total assistance; Assist x2           Transfers:  Sit to Stand:  Moderate assistance; Assist x2  Stand to Sit: Minimum assistance; Assist x2                             Balance:  Sitting: Impaired  Sitting - Static: Fair (occasional)  Sitting - Dynamic: Fair (occasional)  Standing: Impaired  Standing - Static: Constant support  Standing - Dynamic : Constant support  Ambulation/Gait Training:  Distance (ft): 3 Feet (ft)  Assistive Device: Walker, rolling; Gait belt  Ambulation - Level of Assistance: Moderate assistance; Assist x2; Additional time     Gait Description (WDL): Exceptions to WDL  Gait Abnormalities: Antalgic; Decreased step clearance; Step to gait  Right Side Weight Bearing: Partial (%)                            Therapeutic Exercises:   Supine: ankle pumps (AROM), heel slides (AAROM)  Sitting: LAQ  1 set of 10 bilateral LE  Pain Rating:  Premedicated prior to session, fearfull of movement but able to tolerate with improved performance today    Activity Tolerance:   Good and Fair    After treatment patient left in no apparent distress:   Supine in bed, Call bell within reach, Bed / chair alarm activated, Caregiver / family present, and Side rails x 3    COMMUNICATION/COLLABORATION:   The patients plan of care was discussed with: Registered nurse.      Michelle Hanna PT, DPT   Time Calculation: 52 mins

## 2021-11-27 NOTE — PROGRESS NOTES
Problem: Falls - Risk of  Goal: *Absence of Falls  Description: Document Hardik Real Fall Risk and appropriate interventions in the flowsheet.   Outcome: Progressing Towards Goal  Note: Fall Risk Interventions:  Mobility Interventions: Bed/chair exit alarm, Strengthening exercises (ROM-active/passive), Utilize walker, cane, or other assistive device         Medication Interventions: Bed/chair exit alarm, Patient to call before getting OOB, Teach patient to arise slowly    Elimination Interventions: Bed/chair exit alarm, Call light in reach, Toilet paper/wipes in reach

## 2021-11-28 LAB
ATRIAL RATE: 68 BPM
CALCULATED P AXIS, ECG09: 6 DEGREES
CALCULATED R AXIS, ECG10: 64 DEGREES
CALCULATED T AXIS, ECG11: 40 DEGREES
DIAGNOSIS, 93000: NORMAL
P-R INTERVAL, ECG05: 156 MS
Q-T INTERVAL, ECG07: 430 MS
QRS DURATION, ECG06: 74 MS
QTC CALCULATION (BEZET), ECG08: 457 MS
VENTRICULAR RATE, ECG03: 68 BPM

## 2021-11-28 PROCEDURE — 65660000000 HC RM CCU STEPDOWN

## 2021-11-28 PROCEDURE — 97110 THERAPEUTIC EXERCISES: CPT

## 2021-11-28 PROCEDURE — 97530 THERAPEUTIC ACTIVITIES: CPT

## 2021-11-28 PROCEDURE — 74011250636 HC RX REV CODE- 250/636: Performed by: INTERNAL MEDICINE

## 2021-11-28 PROCEDURE — 74011250637 HC RX REV CODE- 250/637: Performed by: INTERNAL MEDICINE

## 2021-11-28 PROCEDURE — 2709999900 HC NON-CHARGEABLE SUPPLY

## 2021-11-28 PROCEDURE — 94760 N-INVAS EAR/PLS OXIMETRY 1: CPT

## 2021-11-28 RX ADMIN — HEPARIN SODIUM 5000 UNITS: 5000 INJECTION INTRAVENOUS; SUBCUTANEOUS at 22:29

## 2021-11-28 RX ADMIN — Medication 10 ML: at 05:51

## 2021-11-28 RX ADMIN — Medication 10 ML: at 22:29

## 2021-11-28 RX ADMIN — LEVOTHYROXINE SODIUM 75 MCG: 0.07 TABLET ORAL at 06:16

## 2021-11-28 RX ADMIN — HEPARIN SODIUM 5000 UNITS: 5000 INJECTION INTRAVENOUS; SUBCUTANEOUS at 15:18

## 2021-11-28 RX ADMIN — HYDRALAZINE HYDROCHLORIDE 10 MG: 20 INJECTION INTRAMUSCULAR; INTRAVENOUS at 08:21

## 2021-11-28 RX ADMIN — SERTRALINE 50 MG: 50 TABLET, FILM COATED ORAL at 22:28

## 2021-11-28 RX ADMIN — OXYCODONE AND ACETAMINOPHEN 1 TABLET: 5; 325 TABLET ORAL at 13:03

## 2021-11-28 RX ADMIN — Medication 10 ML: at 15:22

## 2021-11-28 RX ADMIN — METOPROLOL SUCCINATE 50 MG: 50 TABLET, EXTENDED RELEASE ORAL at 08:21

## 2021-11-28 RX ADMIN — SODIUM CHLORIDE, POTASSIUM CHLORIDE, SODIUM LACTATE AND CALCIUM CHLORIDE 75 ML/HR: 600; 310; 30; 20 INJECTION, SOLUTION INTRAVENOUS at 06:12

## 2021-11-28 RX ADMIN — POLYETHYLENE GLYCOL 3350 17 G: 17 POWDER, FOR SOLUTION ORAL at 11:04

## 2021-11-28 RX ADMIN — HEPARIN SODIUM 5000 UNITS: 5000 INJECTION INTRAVENOUS; SUBCUTANEOUS at 06:10

## 2021-11-28 NOTE — PROGRESS NOTES
Problem: Falls - Risk of  Goal: *Absence of Falls  Description: Document Eulalioney Mess Fall Risk and appropriate interventions in the flowsheet.   Outcome: Progressing Towards Goal  Note: Fall Risk Interventions:  Mobility Interventions: Bed/chair exit alarm         Medication Interventions: Bed/chair exit alarm, Patient to call before getting OOB, Teach patient to arise slowly    Elimination Interventions: Call light in reach, Bed/chair exit alarm, Patient to call for help with toileting needs

## 2021-11-28 NOTE — PROGRESS NOTES
[facility]  [address]  [phone]      Medical Progress Note      NAME: Patrice Piña   :  1932  MRM:  [id number]    Date of service: 2021  7:55 AM       Assessment and Plan:   1.  Closed fracture of multiple right pubic rami/ Closed nondisplaced fracture of right acetabulum. . Pain management. Continue PT/OT evaluation and treatment.  evaluated by Orthopedic and outpatient f/u 1-2 wks with Dr Kate Osuna. Need SNF     2.   Hypertension. on metoprolol. Continue to hold HCTZ due to hyponatremia. On hydralazin IV PRN.      3.  Hyponatremia, mild.  monitor. likely due to HCTZ. On IVF      4.  Hyperglycemia.  A1C is 5.4     5.  Leukocytosis. Likely reactive due to #1. Resolved.      6.  Dyslipidemia.  not listing meds.     7.  Anxiety disorder.  resume home meds. '     8.  Fall.  fall precaution. PT/OT eval.           Subjective:     Chief Complaint[de-identified] Patient was seen and examined as a follow up for pelvic fracture. Chart was reviewed. pain is getting better     ROS:  (bold if positive, if negative)    Tolerating PT  Tolerating Diet        Objective:     Last 24hrs VS reviewed since prior progress note.  Most recent are:    Visit Vitals  BP (!) 171/75   Pulse 78   Temp 97.7 °F (36.5 °C)   Resp 18   Ht 4' 11\" (1.499 m)   Wt 54.4 kg (120 lb)   SpO2 92%   BMI 24.24 kg/m²     SpO2 Readings from Last 6 Encounters:   21 92%            Intake/Output Summary (Last 24 hours) at 2021 0755  Last data filed at 2021 1732  Gross per 24 hour   Intake 390 ml   Output --   Net 390 ml        Physical Exam:    Gen:  Well-developed, well-nourished, in no acute distress  HEENT:  Pink conjunctivae, PERRL, hearing intact to voice, moist mucous membranes  Neck:  Supple, without masses, thyroid non-tender  Resp:  No accessory muscle use, clear breath sounds without wheezes rales or rhonchi  Card:  No murmurs, normal S1, S2 without thrills, bruits or peripheral edema  Abd:  Soft, non-tender, non-distended, normoactive bowel sounds are present, no palpable organomegaly and no detectable hernias  Lymph:  No cervical or inguinal adenopathy  Musc:  No cyanosis or clubbing  Skin:  No rashes or ulcers, skin turgor is good  Neuro:  Cranial nerves are grossly intact, no focal motor weakness, follows commands appropriately  Psych:  Good insight, oriented to person, place and time, alert  __________________________________________________________________  Medications Reviewed: (see below)  Medications:     Current Facility-Administered Medications   Medication Dose Route Frequency    levothyroxine (SYNTHROID) tablet 75 mcg  75 mcg Oral ACB    metoprolol succinate (TOPROL-XL) XL tablet 50 mg  50 mg Oral DAILY    sertraline (ZOLOFT) tablet 50 mg  50 mg Oral QHS    sodium chloride (NS) flush 5-40 mL  5-40 mL IntraVENous Q8H    sodium chloride (NS) flush 5-40 mL  5-40 mL IntraVENous PRN    acetaminophen (TYLENOL) tablet 650 mg  650 mg Oral Q6H PRN    Or    acetaminophen (TYLENOL) suppository 650 mg  650 mg Rectal Q6H PRN    polyethylene glycol (MIRALAX) packet 17 g  17 g Oral DAILY PRN    ondansetron (ZOFRAN ODT) tablet 4 mg  4 mg Oral Q8H PRN    Or    ondansetron (ZOFRAN) injection 4 mg  4 mg IntraVENous Q6H PRN    heparin (porcine) injection 5,000 Units  5,000 Units SubCUTAneous Q8H    oxyCODONE-acetaminophen (PERCOCET) 5-325 mg per tablet 1 Tablet  1 Tablet Oral Q4H PRN    morphine injection 2 mg  2 mg IntraVENous Q4H PRN    hydrALAZINE (APRESOLINE) 20 mg/mL injection 10 mg  10 mg IntraVENous Q6H PRN        Lab Data Reviewed: (see below)  Lab Review:     Recent Labs     11/26/21 0153 11/25/21 1949   WBC 10.8 14.0*   HGB 13.2 14.9   HCT 40.7 45.0    208     Recent Labs     11/26/21 0153 11/25/21 1949   * 132*   K 3.6 4.2   CL 98 98   CO2 27 27   * 147*   BUN 13 15   CREA 0.94 1.05*   CA 8.7 9.0   MG 1.8  --    PHOS 3.0  --    ALB 3.1* 3.4*   TBILI 0.5 0.6   ALT 18 UNABLE TO OBTAIN ACCURATE RESULTS DUE TO GROSS LIPEMIA     No results found for: GLUCPOC  No results for input(s): PH, PCO2, PO2, HCO3, FIO2 in the last 72 hours. No results for input(s): INR, INREXT in the last 72 hours. All Micro Results     None          I have reviewed notes of prior 24hr. Other pertinent lab: Total time spent with patient: 28 I personally reviewed chart, notes, data and current medications in the medical record. I have personally examined and treated the patient at bedside during this period.                  Care Plan discussed with: Patient, Nursing Staff and >50% of time spent in counseling and coordination of care    Discussed:  Care Plan    Prophylaxis:  Hep SQ    Disposition:  SNF/LTC           ___________________________________________________    Attending Physician: Rema Briggs MD

## 2021-11-28 NOTE — PROGRESS NOTES
11/28/2021  3:20 PM  Care Management Progress Note      ICD-10-CM ICD-9-CM    1. Closed fracture of multiple pubic rami, right, initial encounter ([facility] [address]  S32.591A 808.2    2. Closed nondisplaced fracture of right acetabulum, unspecified portion of acetabulum, initial encounter ([facility] [address]  S32.401A 808.0        RUR:  10%  Risk Level: [x]Low []Moderate []High  Value-based purchasing: [] Yes [x] No  Bundle patient: [] Yes [x] No   Specify:     Transition of care plan:  1. Awaiting medical clearance and DC order. PT/OT treating. Ortho following. 2. SNF - CM noted SNF recommendation. CM met with pt and pt's DTR to discuss further. SNF listing provided, and preferences requested. 3. Outpatient follow-up. 4. Transport need TBD.

## 2021-11-28 NOTE — PROGRESS NOTES
Problem: Mobility Impaired (Adult and Pediatric)  Goal: *Acute Goals and Plan of Care (Insert Text)  Description: FUNCTIONAL STATUS PRIOR TO ADMISSION: Patient was independent and active without use of DME.    HOME SUPPORT PRIOR TO ADMISSION: The patient lived with alone, very supportive family. Physical Therapy Goals  Initiated 11/26/2021  1. Patient will move from supine to sit and sit to supine  in bed with maximal assistance within 7 day(s). 2.  Patient will transfer from bed to chair and chair to bed with maximal assistance using the least restrictive device within 7 day(s). 3.  Patient will perform sit to stand with maximal assistance within 7 day(s). 4.  Patient will ambulate with maximal assistance for 15 feet with the least restrictive device within 7 day(s). Outcome: Progressing Towards Goal   PHYSICAL THERAPY TREATMENT  Patient: Renetta Ward (80 y.o. female)  Date: 11/28/2021  Diagnosis: Closed fracture of multiple pubic rami (HCC) [S32.599A]   Closed fracture of multiple pubic rami (HCC)       Precautions: Fall, PWB  Chart, physical therapy assessment, plan of care and goals were reviewed. ASSESSMENT  Patient continues with skilled PT services and is progressing towards goals. Pt remains limited by pain and decreased ability to weight bear or RLE or utilize UEs to off-load painful extremity. Pt received supine in bed saturated in urine and requesting commode for BM. Pt requires A x 1 to transition to EOB which is significantly less than the assist needed yesterday. Stood to [address] [place] [address] with Min A x 2 and transferred to [facility] with minimal foot clearance. Sliding bilateral feet across the floor. Pt requires Total A for hygiene in standing and then transferred to recliner chair. All needs in reach. Will require SNF placement. Current Level of Function Impacting Discharge (mobility/balance):  Mod A for transfer    Other factors to consider for discharge: pain control, unable to ambulate household distances         PLAN :  Patient continues to benefit from skilled intervention to address the above impairments. Continue treatment per established plan of care. to address goals. Recommendation for discharge: (in order for the patient to meet his/her long term goals)  Therapy up to 5 days/week in SNF setting    This discharge recommendation:  Has been made in collaboration with the attending provider and/or case management    IF patient discharges home will need the following DME: bedside commode, rolling walker, and wheelchair       SUBJECTIVE:   Patient stated I didn't think the suction thing was attached right.     OBJECTIVE DATA SUMMARY:   Critical Behavior:  Neurologic State: Alert  Orientation Level: Oriented X4  Cognition: Follows commands  Safety/Judgement: Awareness of environment, Fall prevention, Home safety, Insight into deficits  Functional Mobility Training:  Bed Mobility:  Rolling: Moderate assistance  Supine to Sit: Moderate assistance; Assist x1              Transfers:  Sit to Stand: Minimum assistance; Assist x2  Stand to Sit: Minimum assistance; Assist x2        Bed to Chair: Moderate assistance; Assist x2 (chair brought up behind patient)                    Balance:  Sitting: Impaired  Sitting - Static: Good (unsupported)  Sitting - Dynamic: Fair (occasional)  Standing: Impaired  Standing - Static: Fair; Constant support  Standing - Dynamic : Poor  Ambulation/Gait Training:  Distance (ft): 3 Feet (ft)  Assistive Device: Gait belt; Walker, rolling  Ambulation - Level of Assistance: Moderate assistance; Assist x1        Gait Abnormalities: Antalgic; Decreased step clearance; Step to gait                        Activity Tolerance:   Fair    After treatment patient left in no apparent distress:   Sitting in chair, Call bell within reach, and Bed / chair alarm activated    COMMUNICATION/COLLABORATION:   The patients plan of care was discussed with: Registered nurseMauro Mcfadden Saud Kirkland, PT   Time Calculation: 43 mins

## 2021-11-29 LAB
ANION GAP SERPL CALC-SCNC: 3 MMOL/L (ref 5–15)
BUN SERPL-MCNC: 15 MG/DL (ref 6–20)
BUN/CREAT SERPL: 22 (ref 12–20)
CALCIUM SERPL-MCNC: 8.6 MG/DL (ref 8.5–10.1)
CHLORIDE SERPL-SCNC: 102 MMOL/L (ref 97–108)
CO2 SERPL-SCNC: 30 MMOL/L (ref 21–32)
CREAT SERPL-MCNC: 0.67 MG/DL (ref 0.55–1.02)
GLUCOSE SERPL-MCNC: 104 MG/DL (ref 65–100)
POTASSIUM SERPL-SCNC: 4.2 MMOL/L (ref 3.5–5.1)
SODIUM SERPL-SCNC: 135 MMOL/L (ref 136–145)

## 2021-11-29 PROCEDURE — 74011250637 HC RX REV CODE- 250/637: Performed by: INTERNAL MEDICINE

## 2021-11-29 PROCEDURE — 97110 THERAPEUTIC EXERCISES: CPT

## 2021-11-29 PROCEDURE — 80048 BASIC METABOLIC PNL TOTAL CA: CPT

## 2021-11-29 PROCEDURE — 97530 THERAPEUTIC ACTIVITIES: CPT

## 2021-11-29 PROCEDURE — 94760 N-INVAS EAR/PLS OXIMETRY 1: CPT

## 2021-11-29 PROCEDURE — 36415 COLL VENOUS BLD VENIPUNCTURE: CPT

## 2021-11-29 PROCEDURE — 65660000000 HC RM CCU STEPDOWN

## 2021-11-29 PROCEDURE — 74011250636 HC RX REV CODE- 250/636: Performed by: INTERNAL MEDICINE

## 2021-11-29 PROCEDURE — 97110 THERAPEUTIC EXERCISES: CPT | Performed by: OCCUPATIONAL THERAPIST

## 2021-11-29 PROCEDURE — 77030038269 HC DRN EXT URIN PURWCK BARD -A

## 2021-11-29 RX ORDER — AMLODIPINE BESYLATE 5 MG/1
5 TABLET ORAL DAILY
Status: DISCONTINUED | OUTPATIENT
Start: 2021-11-29 | End: 2021-12-01 | Stop reason: HOSPADM

## 2021-11-29 RX ADMIN — HYDRALAZINE HYDROCHLORIDE 10 MG: 20 INJECTION INTRAMUSCULAR; INTRAVENOUS at 05:13

## 2021-11-29 RX ADMIN — OXYCODONE AND ACETAMINOPHEN 1 TABLET: 5; 325 TABLET ORAL at 16:06

## 2021-11-29 RX ADMIN — OXYCODONE AND ACETAMINOPHEN 1 TABLET: 5; 325 TABLET ORAL at 08:42

## 2021-11-29 RX ADMIN — HEPARIN SODIUM 5000 UNITS: 5000 INJECTION INTRAVENOUS; SUBCUTANEOUS at 21:22

## 2021-11-29 RX ADMIN — AMLODIPINE BESYLATE 5 MG: 5 TABLET ORAL at 10:59

## 2021-11-29 RX ADMIN — Medication 10 ML: at 08:26

## 2021-11-29 RX ADMIN — METOPROLOL SUCCINATE 50 MG: 50 TABLET, EXTENDED RELEASE ORAL at 08:25

## 2021-11-29 RX ADMIN — SERTRALINE 50 MG: 50 TABLET, FILM COATED ORAL at 21:20

## 2021-11-29 RX ADMIN — HEPARIN SODIUM 5000 UNITS: 5000 INJECTION INTRAVENOUS; SUBCUTANEOUS at 05:13

## 2021-11-29 RX ADMIN — Medication 10 ML: at 21:22

## 2021-11-29 RX ADMIN — HEPARIN SODIUM 5000 UNITS: 5000 INJECTION INTRAVENOUS; SUBCUTANEOUS at 16:06

## 2021-11-29 RX ADMIN — LEVOTHYROXINE SODIUM 75 MCG: 0.07 TABLET ORAL at 05:14

## 2021-11-29 RX ADMIN — Medication 10 ML: at 16:05

## 2021-11-29 NOTE — PROGRESS NOTES
Problem: Self Care Deficits Care Plan (Adult)  Goal: *Acute Goals and Plan of Care (Insert Text)  Description:   FUNCTIONAL STATUS PRIOR TO ADMISSION: Patient was independent and active without use of DME.     HOME SUPPORT: The patient lived alone with daughter to provide assistance. Occupational Therapy Goals  Initiated 11/26/2021  1. Patient will perform self-feeding with supervision/set-up seated edge of bed or in chair within 7 day(s). 2.  Patient will perform upper body dressing with minimal assistance and best technique within 7 day(s). 3.  Patient will perform grooming with supervision/set-up seated edge of bed within 7 day(s). 4.  Patient will perform static standing  with bilateral UE support with moderate assistance x's 2 for > or = 1 minute  within 7 day(s). 5.   Patient will participate in upper extremity therapeutic exercise/activities with supervision/set-up for 10 minutes within 7 day(s). 6.  Patient will perform pursed lip breathing during functional mobility with min cues within 7 day(s). Outcome: Progressing Towards Goal     OCCUPATIONAL THERAPY TREATMENT  Patient: Laura Albarado (18 y.o. female)  Date: 11/29/2021  Diagnosis: Closed fracture of multiple pubic rami (HCC) [S32.599A]   Closed fracture of multiple pubic rami (HCC)       Precautions: Fall, PWB  Chart, occupational therapy assessment, plan of care, and goals were reviewed. ASSESSMENT  Patient continues with skilled OT services and is progressing towards goals. She declined participation in ADLs and functional mobility, but agreeable to BUE HEP. Educated pt on benefits of exer to improve her strength and endurance for all functional tasks. Initiated BUE yellow theraband HEP using written handout. Pt required mod verbal, visual and tactile cues to perform correctly. Continue to recommend rehab at discharge.      Current Level of Function Impacting Discharge (ADLs): max A LE ADLs and toileting, per PT min A x2 for OOB transfers    Other factors to consider for discharge: see above         PLAN :  Patient continues to benefit from skilled intervention to address the above impairments. Continue treatment per established plan of care to address goals. Recommend with staff: up to chair for all meals and BSC for toileting    Recommend next OT session: LE ADLs using AE, review theraband HEP    Recommendation for discharge: (in order for the patient to meet his/her long term goals)  Therapy up to 5 days/week in SNF setting    This discharge recommendation:  Has been made in collaboration with the attending provider and/or case management    IF patient discharges home will need the following DME: TBD       SUBJECTIVE:   Patient stated I don't want to be disappointed with my progress, so I only want to try the exer.     OBJECTIVE DATA SUMMARY:   Cognitive/Behavioral Status:  Neurologic State: Alert; Appropriate for age  Orientation Level: Oriented X4  Cognition: Follows commands  Perception: Appears intact  Perseveration: Perseverates during ADLS; Perseverates during conversation  Safety/Judgement: Awareness of environment; Fall prevention; Insight into deficits    Functional Mobility and Transfers for ADLs:  Bed Mobility:  Rolling: Minimum assistance; Additional time  Supine to Sit: Minimum assistance; Additional time  Scooting: Minimum assistance; Additional time    Transfers:  Sit to Stand: Minimum assistance; Assist x1     Bed to Chair: Moderate assistance; Assist x1; Additional time    ADL Intervention:  Cognitive Retraining  Safety/Judgement: Awareness of environment; Fall prevention; Insight into deficits    Therapeutic Exercises:   Educated pt on benefits of exer to improve her strength and endurance for all functional tasks. Initiated BUE yellow theraband HEP using written handout. Pt demonstrated 5 reps each exer and agreed to perform 10 reps 3x day as able.     Pain:  6/10    Activity Tolerance:   Fair and requires frequent rest breaks    After treatment patient left in no apparent distress:   Sitting in chair, Call bell within reach, Bed / chair alarm activated, and Caregiver / family present    COMMUNICATION/COLLABORATION:   The patients plan of care was discussed with: Registered nurse.      Azar Neri OT  Time Calculation: 16 mins

## 2021-11-29 NOTE — PROGRESS NOTES
Jake Hou [facility] [address]  [address] [facility], [place], [address] [place] [address]  [phone]      Medical Progress Note      NAME: Ivan Webber   :  1932  MRM:  [id number]    Date of service: 2021  7:55 AM       Assessment and Plan:   1.  Closed fracture of multiple right pubic rami/ Closed nondisplaced fracture of right acetabulum. . Pain management. Continue PT/OT evaluation and treatment.  evaluated by Orthopedic and outpatient f/u 1-2 wks with Dr Kim Lin. Need SNF     2.   Hypertension. on metoprolol. Continue to hold HCTZ due to hyponatremia. Added amlodipine. On hydralazin IV PRN.      3.  Hyponatremia, mild.  monitor. likely due to HCTZ. On IVF      4.  Hyperglycemia.  A1C is 5.4     5.  Leukocytosis. Likely reactive due to #1. Resolved.      6.  Dyslipidemia.  not listing meds.     7.  Anxiety disorder.  resume home meds. '     8.  Fall.  fall precaution. PT/OT eval.           Subjective:     Chief Complaint[de-identified] Patient was seen and examined as a follow up for pelvic fracture. Chart was reviewed. pelvic pain with movement     ROS:  (bold if positive, if negative)    Tolerating PT  Tolerating Diet        Objective:     Last 24hrs VS reviewed since prior progress note.  Most recent are:    Visit Vitals  BP (!) 179/77 (BP 1 Location: Left upper arm, BP Patient Position: At rest)   Pulse 68   Temp 98.1 °F (36.7 °C)   Resp 18   Ht 4' 11\" (1.499 m)   Wt 54.4 kg (120 lb)   SpO2 96%   BMI 24.24 kg/m²     SpO2 Readings from Last 6 Encounters:   21 96%            Intake/Output Summary (Last 24 hours) at 2021 0847  Last data filed at 2021 0653  Gross per 24 hour   Intake 240 ml   Output 200 ml   Net 40 ml        Physical Exam:    Gen:  Well-developed, well-nourished, in no acute distress  HEENT:  Pink conjunctivae, PERRL, hearing intact to voice, moist mucous membranes  Neck:  Supple, without masses, thyroid non-tender  Resp:  No accessory muscle use, clear breath sounds without wheezes rales or rhonchi  Card:  No murmurs, normal S1, S2 without thrills, bruits or peripheral edema  Abd:  Soft, non-tender, non-distended, normoactive bowel sounds are present, no palpable organomegaly and no detectable hernias  Lymph:  No cervical or inguinal adenopathy  Musc:  No cyanosis or clubbing  Skin:  No rashes or ulcers, skin turgor is good  Neuro:  Cranial nerves are grossly intact, no focal motor weakness, follows commands appropriately  Psych:  Good insight, oriented to person, place and time, alert  __________________________________________________________________  Medications Reviewed: (see below)  Medications:     Current Facility-Administered Medications   Medication Dose Route Frequency    amLODIPine (NORVASC) tablet 5 mg  5 mg Oral DAILY    levothyroxine (SYNTHROID) tablet 75 mcg  75 mcg Oral ACB    metoprolol succinate (TOPROL-XL) XL tablet 50 mg  50 mg Oral DAILY    sertraline (ZOLOFT) tablet 50 mg  50 mg Oral QHS    sodium chloride (NS) flush 5-40 mL  5-40 mL IntraVENous Q8H    sodium chloride (NS) flush 5-40 mL  5-40 mL IntraVENous PRN    acetaminophen (TYLENOL) tablet 650 mg  650 mg Oral Q6H PRN    Or    acetaminophen (TYLENOL) suppository 650 mg  650 mg Rectal Q6H PRN    polyethylene glycol (MIRALAX) packet 17 g  17 g Oral DAILY PRN    ondansetron (ZOFRAN ODT) tablet 4 mg  4 mg Oral Q8H PRN    Or    ondansetron (ZOFRAN) injection 4 mg  4 mg IntraVENous Q6H PRN    heparin (porcine) injection 5,000 Units  5,000 Units SubCUTAneous Q8H    oxyCODONE-acetaminophen (PERCOCET) 5-325 mg per tablet 1 Tablet  1 Tablet Oral Q4H PRN    morphine injection 2 mg  2 mg IntraVENous Q4H PRN    hydrALAZINE (APRESOLINE) 20 mg/mL injection 10 mg  10 mg IntraVENous Q6H PRN        Lab Data Reviewed: (see below)  Lab Review:     No results for input(s): WBC, HGB, HCT, PLT, HGBEXT, HCTEXT, PLTEXT, HGBEXT, HCTEXT, PLTEXT in the last 72 hours.   Recent Labs     11/29/21  0509   *   K 4.2   CL 102   CO2 30   *   BUN 15   CREA 0.67   CA 8.6     No results found for: GLUCPOC  No results for input(s): PH, PCO2, PO2, HCO3, FIO2 in the last 72 hours. No results for input(s): INR, INREXT, INREXT in the last 72 hours. All Micro Results     None          I have reviewed notes of prior 24hr. Other pertinent lab: Total time spent with patient: 28 I personally reviewed chart, notes, data and current medications in the medical record. I have personally examined and treated the patient at bedside during this period.                  Care Plan discussed with: Patient, Nursing Staff and >50% of time spent in counseling and coordination of care    Discussed:  Care Plan    Prophylaxis:  Hep SQ    Disposition:  SNF/LTC           ___________________________________________________    Attending Physician: Emily Vargas MD

## 2021-11-29 NOTE — PROGRESS NOTES
Problem: Pressure Injury - Risk of  Goal: *Prevention of pressure injury  Description: Document Gatito Scale and appropriate interventions in the flowsheet.   Note: Pressure Injury Interventions:  Sensory Interventions: Assess changes in LOC, Minimize linen layers, Keep linens dry and wrinkle-free, Float heels    Moisture Interventions: Minimize layers, Absorbent underpads, Maintain skin hydration (lotion/cream)    Activity Interventions: Increase time out of bed, Pressure redistribution bed/mattress(bed type)    Mobility Interventions: HOB 30 degrees or less    Nutrition Interventions: Document food/fluid/supplement intake    Friction and Shear Interventions: Minimize layers, HOB 30 degrees or less

## 2021-11-29 NOTE — PROGRESS NOTES
Problem: Mobility Impaired (Adult and Pediatric)  Goal: *Acute Goals and Plan of Care (Insert Text)  Description: FUNCTIONAL STATUS PRIOR TO ADMISSION: Patient was independent and active without use of DME.    HOME SUPPORT PRIOR TO ADMISSION: The patient lived with alone, very supportive family. Physical Therapy Goals  Initiated 11/26/2021  1. Patient will move from supine to sit and sit to supine  in bed with maximal assistance within 7 day(s). 2.  Patient will transfer from bed to chair and chair to bed with maximal assistance using the least restrictive device within 7 day(s). 3.  Patient will perform sit to stand with maximal assistance within 7 day(s). 4.  Patient will ambulate with maximal assistance for 15 feet with the least restrictive device within 7 day(s). Note:   PHYSICAL THERAPY TREATMENT  Patient: Mitra Petty (47 y.o. female)  Date: 11/29/2021  Diagnosis: Closed fracture of multiple pubic rami (HCC) [S32.599A]   Closed fracture of multiple pubic rami (HCC)       Precautions: Fall, PWB  Chart, physical therapy assessment, plan of care and goals were reviewed. ASSESSMENT  Patient continues with skilled PT services and is progressing towards goals. Patient continues to progress daily, today only required 1 person assist.  Able to transfer to sit up in chair with RW. Requires increased time for completion of all activites. Patient still with increased pain in weight bearing and only shuffles step, able to maintain PWB. Remained sitting up in chair without difficulties. .     Current Level of Function Impacting Discharge (mobility/balance): MIN A with RW    Other factors to consider for discharge:          PLAN :  Patient continues to benefit from skilled intervention to address the above impairments. Continue treatment per established plan of care. to address goals.     Recommendation for discharge: (in order for the patient to meet his/her long term goals)  Therapy up to 5 days/week in rehab setting    This discharge recommendation:  A follow-up discussion with the attending provider and/or case management is planned    IF patient discharges home will need the following DME: to be determined (TBD)       SUBJECTIVE:   Patient stated that did not hurt.     OBJECTIVE DATA SUMMARY:   Critical Behavior:  Neurologic State: Alert  Orientation Level: Oriented X4  Cognition: Follows commands, Appropriate decision making, Appropriate for age attention/concentration, Appropriate safety awareness  Safety/Judgement: Awareness of environment, Fall prevention, Home safety, Insight into deficits  Functional Mobility Training:  Bed Mobility:  Rolling: Minimum assistance; Additional time  Supine to Sit: Minimum assistance; Additional time     Scooting: Minimum assistance; Additional time        Transfers:  Sit to Stand: Minimum assistance; Assist x1  Stand to Sit: Minimum assistance; Additional time        Bed to Chair: Moderate assistance; Assist x1; Additional time                    Balance:     Ambulation/Gait Training:                Therapeutic Exercises:   Seated: LAQ, hip flexion, ankle pumps  2 sets of 10  Pain Rating:  Premedicated prior to session, pain with weight bearing,improved with bed mobility and transfers    Activity Tolerance:   Good and Fair    After treatment patient left in no apparent distress:   Sitting in chair, Call bell within reach, and Bed / chair alarm activated    COMMUNICATION/COLLABORATION:   The patients plan of care was discussed with: Registered nurse.      Nils Osgood, PT, DPT   Time Calculation: 38 mins

## 2021-11-29 NOTE — PROGRESS NOTES
9:10am:  CM met with patient to obtain SNF choices. Patient has deferred to her daughter, Marilynn Owens. Call placed to daughter and vm left. 11:15am:  CM received vm from patient's daughter. Preference is for [facility], then [facility].  Referrals have been submitted. Transition of care plan- RUR 9%:  1. CM following  2. PT/OT following - recommending SNF; daughter and patient are agreeable  3. Discharge plan is for SNF- referrals are pending with [facility], [facility] and [facility]  4.  [facility] transport at d/c    [facility]

## 2021-11-30 LAB
COVID-19 RAPID TEST, COVR: NOT DETECTED
SOURCE, COVRS: NORMAL

## 2021-11-30 PROCEDURE — 74011250637 HC RX REV CODE- 250/637: Performed by: INTERNAL MEDICINE

## 2021-11-30 PROCEDURE — 74011250636 HC RX REV CODE- 250/636: Performed by: INTERNAL MEDICINE

## 2021-11-30 PROCEDURE — 65660000000 HC RM CCU STEPDOWN

## 2021-11-30 PROCEDURE — 97110 THERAPEUTIC EXERCISES: CPT

## 2021-11-30 PROCEDURE — 97535 SELF CARE MNGMENT TRAINING: CPT

## 2021-11-30 PROCEDURE — 77030038269 HC DRN EXT URIN PURWCK BARD -A

## 2021-11-30 PROCEDURE — 97530 THERAPEUTIC ACTIVITIES: CPT

## 2021-11-30 PROCEDURE — 87635 SARS-COV-2 COVID-19 AMP PRB: CPT

## 2021-11-30 PROCEDURE — 94760 N-INVAS EAR/PLS OXIMETRY 1: CPT

## 2021-11-30 RX ORDER — OXYCODONE AND ACETAMINOPHEN 5; 325 MG/1; MG/1
1 TABLET ORAL
Qty: 12 TABLET | Refills: 0 | Status: SHIPPED | OUTPATIENT
Start: 2021-11-30 | End: 2021-12-03

## 2021-11-30 RX ORDER — ACETAMINOPHEN 500 MG
TABLET ORAL
Qty: 100 TABLET | Refills: 0 | Status: SHIPPED | OUTPATIENT
Start: 2021-11-30 | End: 2021-12-19

## 2021-11-30 RX ORDER — AMLODIPINE BESYLATE 5 MG/1
5 TABLET ORAL DAILY
Qty: 30 TABLET | Refills: 0 | Status: SHIPPED
Start: 2021-12-01 | End: 2022-01-28

## 2021-11-30 RX ADMIN — OXYCODONE AND ACETAMINOPHEN 1 TABLET: 5; 325 TABLET ORAL at 09:17

## 2021-11-30 RX ADMIN — Medication 10 ML: at 06:53

## 2021-11-30 RX ADMIN — Medication 10 ML: at 14:14

## 2021-11-30 RX ADMIN — METOPROLOL SUCCINATE 50 MG: 50 TABLET, EXTENDED RELEASE ORAL at 09:17

## 2021-11-30 RX ADMIN — HEPARIN SODIUM 5000 UNITS: 5000 INJECTION INTRAVENOUS; SUBCUTANEOUS at 21:15

## 2021-11-30 RX ADMIN — HEPARIN SODIUM 5000 UNITS: 5000 INJECTION INTRAVENOUS; SUBCUTANEOUS at 06:50

## 2021-11-30 RX ADMIN — LEVOTHYROXINE SODIUM 75 MCG: 0.07 TABLET ORAL at 06:50

## 2021-11-30 RX ADMIN — Medication 10 ML: at 21:16

## 2021-11-30 RX ADMIN — AMLODIPINE BESYLATE 5 MG: 5 TABLET ORAL at 09:17

## 2021-11-30 RX ADMIN — SERTRALINE 50 MG: 50 TABLET, FILM COATED ORAL at 21:15

## 2021-11-30 RX ADMIN — HEPARIN SODIUM 5000 UNITS: 5000 INJECTION INTRAVENOUS; SUBCUTANEOUS at 14:14

## 2021-11-30 NOTE — PROGRESS NOTES
Problem: Self Care Deficits Care Plan (Adult)  Goal: *Acute Goals and Plan of Care (Insert Text)  Description:   FUNCTIONAL STATUS PRIOR TO ADMISSION: Patient was independent and active without use of DME.     HOME SUPPORT: The patient lived alone with daughter to provide assistance. Occupational Therapy Goals  Initiated 11/26/2021  1. Patient will perform self-feeding with supervision/set-up seated edge of bed or in chair within 7 day(s). 2.  Patient will perform upper body dressing with minimal assistance and best technique within 7 day(s). 3.  Patient will perform grooming with supervision/set-up seated edge of bed within 7 day(s). 4.  Patient will perform static standing  with bilateral UE support with moderate assistance x's 2 for > or = 1 minute  within 7 day(s). 5.   Patient will participate in upper extremity therapeutic exercise/activities with supervision/set-up for 10 minutes within 7 day(s). 6.  Patient will perform pursed lip breathing during functional mobility with min cues within 7 day(s). Outcome: Progressing Towards Goal   OCCUPATIONAL THERAPY TREATMENT  Patient: Patrice Piña (89 y.o. female)  Date: 11/30/2021  Diagnosis: Closed fracture of multiple pubic rami (HCC) [S32.599A]   Closed fracture of multiple pubic rami (HCC)       Precautions: Fall, PWB  Chart, occupational therapy assessment, plan of care, and goals were reviewed. ASSESSMENT  Patient continues with skilled OT services and is progressing towards goals. Pt seen for ADL re-training with use of AE. She was able to doff socks with use of dressing stick and use sock aide after demonstration. Pt interested in obtaining hip kit and gave her options of where to purchase. Reviewed UE exercises with use of yellow band in seated position.     Current Level of Function Impacting Discharge (ADLs): contact guard to doff/don socks with AE, educated as to AE for LB dress    Other factors to consider for discharge:          PLAN :  Patient continues to benefit from skilled intervention to address the above impairments. Continue treatment per established plan of care to address goals. Recommend with staff: out of bed for ADL's, there ex, there act, meals    Recommend next OT session: cont towards goals    Recommendation for discharge: (in order for the patient to meet his/her long term goals)  Therapy up to 5 days/week in SNF setting    This discharge recommendation:  Has not yet been discussed the attending provider and/or case management    IF patient discharges home will need the following DME:        SUBJECTIVE:   Patient stated Is this the right way?     OBJECTIVE DATA SUMMARY:   Cognitive/Behavioral Status:  Neurologic State: Alert  Orientation Level: Oriented X4  Cognition: Appropriate decision making             Functional Mobility and Transfers for ADLs:  Bed Mobility:   Not tested as pt already in chair    Transfers:      Not tested       Balance:  Sitting: Intact  Standing: Impaired    ADL Intervention:       Lower Body Dressing Assistance  Dressing Assistance: Contact guard assistance  Socks: Contact guard assistance  Position Performed: Seated in chair  Adaptive Equipment Used: Dressing stick;  Sock aid              Therapeutic Exercises:     EXERCISE   Sets   Reps   Active Active Assist   Passive   Comments   Chest presses 1 10 [x]           []           []           Yellow band   Elbow flex/ext 1 10 [x]           []           []           Yellow band   Shoulder ab/add 1 10 [x]           []           []           Yellow band      []           []           []                 []           []           []                 []           []           []                 []           []           []                 []           []           []                 []           []           []                 []           []           []                 []           []           []                   Activity Tolerance:   Fair    After treatment patient left in no apparent distress:   Sitting in chair    COMMUNICATION/COLLABORATION:   The patients plan of care was discussed with: Physical therapist, Occupational therapist,    CHRISTA Benjamin  Time Calculation: 31 mins

## 2021-11-30 NOTE — PROGRESS NOTES
Fall Risk Interventions:    Mobility Interventions: Bed/chair exit alarm                   Medication Interventions: Bed/chair exit alarm         Elimination Interventions: Bed/chair exit alarm         History of Falls Interventions: Bed/chair exit alarm

## 2021-11-30 NOTE — PROGRESS NOTES
Problem: Mobility Impaired (Adult and Pediatric)  Goal: *Acute Goals and Plan of Care (Insert Text)  Description: FUNCTIONAL STATUS PRIOR TO ADMISSION: Patient was independent and active without use of DME.    HOME SUPPORT PRIOR TO ADMISSION: The patient lived with alone, very supportive family. Physical Therapy Goals  Initiated 11/26/2021  1. Patient will move from supine to sit and sit to supine  in bed with maximal assistance within 7 day(s). 2.  Patient will transfer from bed to chair and chair to bed with maximal assistance using the least restrictive device within 7 day(s). 3.  Patient will perform sit to stand with maximal assistance within 7 day(s). 4.  Patient will ambulate with maximal assistance for 15 feet with the least restrictive device within 7 day(s). Note:   PHYSICAL THERAPY TREATMENT  Patient: Bilyl Dorantes (31 y.o. female)  Date: 11/30/2021  Diagnosis: Closed fracture of multiple pubic rami (HCC) [S32.599A]   Closed fracture of multiple pubic rami (HCC)       Precautions: Fall, PWB  Chart, physical therapy assessment, plan of care and goals were reviewed. ASSESSMENT  Patient continues with skilled PT services and is progressing towards goals. Patient able to take a few more steps today vs. Scooting foot. Still requires increased verbal cuing for UE offloading and increased time for completion. Patient able to transfer to bedside commode and then back to bed. Patient still awaiting SNF placement. .     Current Level of Function Impacting Discharge (mobility/balance): MIN A with RW and increased time for completion    Other factors to consider for discharge:          PLAN :  Patient continues to benefit from skilled intervention to address the above impairments. Continue treatment per established plan of care. to address goals.     Recommendation for discharge: (in order for the patient to meet his/her long term goals)  Therapy up to 5 days/week in SNF setting    This discharge recommendation:  A follow-up discussion with the attending provider and/or case management is planned    IF patient discharges home will need the following DME: to be determined (TBD)       SUBJECTIVE:   Patient stated .    OBJECTIVE DATA SUMMARY:   Critical Behavior:  Neurologic State: Alert  Orientation Level: Oriented X4  Cognition: Appropriate decision making  Safety/Judgement: Awareness of environment, Fall prevention, Insight into deficits  Functional Mobility Training:  Bed Mobility:        Sit to Supine: Minimum assistance; Assist x1; Additional time           Transfers:  Sit to Stand: Minimum assistance; Assist x1; Additional time  Stand to Sit: Minimum assistance; Assist x1        Bed to Chair: Minimum assistance; Assist x1                    Balance:  Sitting: Intact  Standing: Impaired  Ambulation/Gait Training:  Distance (ft): 5 Feet (ft)  Assistive Device: Walker, rolling; Gait belt  Ambulation - Level of Assistance: Minimal assistance; Assist x1; Additional time           Right Side Weight Bearing: Partial (%)              Pain Rating:  None     Activity Tolerance:   Fair    After treatment patient left in no apparent distress:   Sitting in chair, Call bell within reach, and Bed / chair alarm activated    COMMUNICATION/COLLABORATION:   The patients plan of care was discussed with: Registered nurse.      Yovanny Islas PT, DPT   Time Calculation: 30 mins

## 2021-11-30 NOTE — PROGRESS NOTES
[facility]  [address] [facility], [place], [address] [place] [address]  [phone]      Medical Progress Note      NAME: Mercy Santo   :  1932  MRM:  [id number]    Date of service: 2021  7:55 AM       Assessment and Plan:   1.  Closed fracture of multiple right pubic rami/ Closed nondisplaced fracture of right acetabulum. . Pain management. Continue PT/OT evaluation and treatment.  evaluated by Orthopedic and outpatient f/u 1-2 wks with Dr Winston Cardenas. Need SNF     2.   Hypertension. on metoprolol. Continue to hold HCTZ due to hyponatremia. Added amlodipine. On hydralazin IV PRN.      3.  Hyponatremia, mild.  monitor. likely due to HCTZ. On IVF      4.  Hyperglycemia.  A1C is 5.4     5.  Leukocytosis. Likely reactive due to #1. Resolved.      6.  Dyslipidemia.  not listing meds.     7.  Anxiety disorder.  resume home meds. '     8.  Fall.  fall precaution. PT/OT eval.           Subjective:     Chief Complaint[de-identified] Patient was seen and examined as a follow up for pelvic fracture. Chart was reviewed. pelvic pain with movement     ROS:  (bold if positive, if negative)    Tolerating PT  Tolerating Diet        Objective:     Last 24hrs VS reviewed since prior progress note.  Most recent are:    Visit Vitals  /65 (BP 1 Location: Right upper arm, BP Patient Position: At rest)   Pulse 68   Temp 98.3 °F (36.8 °C)   Resp 18   Ht 4' 11\" (1.499 m)   Wt 54.4 kg (120 lb)   SpO2 95%   BMI 24.24 kg/m²     SpO2 Readings from Last 6 Encounters:   21 95%            Intake/Output Summary (Last 24 hours) at 2021 1019  Last data filed at 2021 1348  Gross per 24 hour   Intake --   Output 100 ml   Net -100 ml        Physical Exam:    Gen:  Well-developed, well-nourished, in no acute distress  HEENT:  Pink conjunctivae, PERRL, hearing intact to voice, moist mucous membranes  Neck:  Supple, without masses, thyroid non-tender  Resp:  No accessory muscle use, clear breath sounds without wheezes rales or rhonchi  Card:  No murmurs, normal S1, S2 without thrills, bruits or peripheral edema  Abd:  Soft, non-tender, non-distended, normoactive bowel sounds are present, no palpable organomegaly and no detectable hernias  Lymph:  No cervical or inguinal adenopathy  Musc:  No cyanosis or clubbing  Skin:  No rashes or ulcers, skin turgor is good  Neuro:  Cranial nerves are grossly intact, no focal motor weakness, follows commands appropriately  Psych:  Good insight, oriented to person, place and time, alert  __________________________________________________________________  Medications Reviewed: (see below)  Medications:     Current Facility-Administered Medications   Medication Dose Route Frequency    amLODIPine (NORVASC) tablet 5 mg  5 mg Oral DAILY    levothyroxine (SYNTHROID) tablet 75 mcg  75 mcg Oral ACB    metoprolol succinate (TOPROL-XL) XL tablet 50 mg  50 mg Oral DAILY    sertraline (ZOLOFT) tablet 50 mg  50 mg Oral QHS    sodium chloride (NS) flush 5-40 mL  5-40 mL IntraVENous Q8H    sodium chloride (NS) flush 5-40 mL  5-40 mL IntraVENous PRN    acetaminophen (TYLENOL) tablet 650 mg  650 mg Oral Q6H PRN    Or    acetaminophen (TYLENOL) suppository 650 mg  650 mg Rectal Q6H PRN    polyethylene glycol (MIRALAX) packet 17 g  17 g Oral DAILY PRN    ondansetron (ZOFRAN ODT) tablet 4 mg  4 mg Oral Q8H PRN    Or    ondansetron (ZOFRAN) injection 4 mg  4 mg IntraVENous Q6H PRN    heparin (porcine) injection 5,000 Units  5,000 Units SubCUTAneous Q8H    oxyCODONE-acetaminophen (PERCOCET) 5-325 mg per tablet 1 Tablet  1 Tablet Oral Q4H PRN    morphine injection 2 mg  2 mg IntraVENous Q4H PRN    hydrALAZINE (APRESOLINE) 20 mg/mL injection 10 mg  10 mg IntraVENous Q6H PRN        Lab Data Reviewed: (see below)  Lab Review:     No results for input(s): WBC, HGB, HCT, PLT, HGBEXT, HCTEXT, PLTEXT, HGBEXT, HCTEXT, PLTEXT in the last 72 hours.   Recent Labs     11/29/21  0509   *   K 4.2      CO2 30   *   BUN 15   CREA 0.67   CA 8.6     No results found for: GLUCPOC  No results for input(s): PH, PCO2, PO2, HCO3, FIO2 in the last 72 hours. No results for input(s): INR, INREXT, INREXT in the last 72 hours. All Micro Results     None          I have reviewed notes of prior 24hr. Other pertinent lab: Total time spent with patient: 13 I personally reviewed chart, notes, data and current medications in the medical record. I have personally examined and treated the patient at bedside during this period.                  Care Plan discussed with: Patient, Nursing Staff and >50% of time spent in counseling and coordination of care    Discussed:  Care Plan    Prophylaxis:  Hep SQ    Disposition:  SNF/LTC           ___________________________________________________    Attending Physician: Gregory Coronado MD

## 2021-11-30 NOTE — PROGRESS NOTES
11/30/2021   3:45 PM  Pt accepted to [address] [place] [address] for rehab, they will have bed available tomorrow. Plan for DC in AM  SELWYN Keller   '  1:38 PM  Transition of care plan  RUR 9%  LOS 5 Days  1.pt medically stable for DC  2. Awaiting SNF placement, additional updates sen to [address] [place] [address] in [facility] and [facility] in [facility]  3. CM requested nursing to obtain  Rapid [facility] for placement   4. [facility] transport, placed on WC   5. CM updated pt's Dtr Christal  6.  CM will continue to follow and assist w/ DC needs  SELWYN Keller

## 2021-11-30 NOTE — INTERDISCIPLINARY ROUNDS
Rounded on patient for Leader Rounding on the unit. Patient is alert and oriented x4 and in no acute distress at this time. Opportunities for questions and comments addressed at this time.    Isra Mccoy PT,DPT,NCS

## 2021-11-30 NOTE — PROGRESS NOTES
11/30/2021  3:42 PM  Medicare pt has received, reviewed, and signed 2nd IM letter informing them of their right to appeal the discharge. Signed copied has been placed on pt bedside chart.   SELWYN Smith

## 2021-12-01 VITALS
DIASTOLIC BLOOD PRESSURE: 69 MMHG | HEART RATE: 71 BPM | HEIGHT: 59 IN | OXYGEN SATURATION: 94 % | RESPIRATION RATE: 20 BRPM | TEMPERATURE: 97.8 F | WEIGHT: 120 LBS | SYSTOLIC BLOOD PRESSURE: 153 MMHG | BODY MASS INDEX: 24.19 KG/M2

## 2021-12-01 LAB
ERYTHROCYTE [DISTWIDTH] IN BLOOD BY AUTOMATED COUNT: 14.4 % (ref 11.5–14.5)
HCT VFR BLD AUTO: 39.8 % (ref 35–47)
HGB BLD-MCNC: 12.6 G/DL (ref 11.5–16)
MCH RBC QN AUTO: 28.4 PG (ref 26–34)
MCHC RBC AUTO-ENTMCNC: 31.7 G/DL (ref 30–36.5)
MCV RBC AUTO: 89.8 FL (ref 80–99)
NRBC # BLD: 0 K/UL (ref 0–0.01)
NRBC BLD-RTO: 0 PER 100 WBC
PLATELET # BLD AUTO: 177 K/UL (ref 150–400)
PMV BLD AUTO: 10.1 FL (ref 8.9–12.9)
RBC # BLD AUTO: 4.43 M/UL (ref 3.8–5.2)
WBC # BLD AUTO: 5.7 K/UL (ref 3.6–11)

## 2021-12-01 PROCEDURE — 74011250637 HC RX REV CODE- 250/637: Performed by: INTERNAL MEDICINE

## 2021-12-01 PROCEDURE — 36415 COLL VENOUS BLD VENIPUNCTURE: CPT

## 2021-12-01 PROCEDURE — 85027 COMPLETE CBC AUTOMATED: CPT

## 2021-12-01 PROCEDURE — 74011250636 HC RX REV CODE- 250/636: Performed by: INTERNAL MEDICINE

## 2021-12-01 PROCEDURE — 94760 N-INVAS EAR/PLS OXIMETRY 1: CPT

## 2021-12-01 RX ADMIN — LEVOTHYROXINE SODIUM 75 MCG: 0.07 TABLET ORAL at 06:40

## 2021-12-01 RX ADMIN — OXYCODONE AND ACETAMINOPHEN 1 TABLET: 5; 325 TABLET ORAL at 14:17

## 2021-12-01 RX ADMIN — HEPARIN SODIUM 5000 UNITS: 5000 INJECTION INTRAVENOUS; SUBCUTANEOUS at 13:44

## 2021-12-01 RX ADMIN — AMLODIPINE BESYLATE 5 MG: 5 TABLET ORAL at 10:34

## 2021-12-01 RX ADMIN — HEPARIN SODIUM 5000 UNITS: 5000 INJECTION INTRAVENOUS; SUBCUTANEOUS at 06:40

## 2021-12-01 RX ADMIN — Medication 10 ML: at 06:42

## 2021-12-01 RX ADMIN — METOPROLOL SUCCINATE 50 MG: 50 TABLET, EXTENDED RELEASE ORAL at 10:34

## 2021-12-01 NOTE — DISCHARGE SUMMARY
Hospitalist Discharge Summary     Patient ID:    Wiliam Larson  [id number]  7 y.o.  7/26/1932    Admit date of service: 11/25/2021    Discharge date of service: 12/1/2021    Admission Diagnoses: Closed fracture of multiple pubic rami ([facility] [S32.599A]    Chronic Diagnoses:    Problem List as of 12/1/2021 Date Reviewed: 11/25/2021          Codes Class Noted - Resolved    * (Principal) Closed fracture of multiple pubic rami [facility]) ICD-10-CM: R63.980K  ICD-9-CM: 808.2  11/25/2021 - Present              Discharge Medications:   Current Discharge Medication List      START taking these medications    Details   amLODIPine (NORVASC) 5 mg tablet Take 1 Tablet by mouth daily. Qty: 30 Tablet, Refills: 0      oxyCODONE-acetaminophen (PERCOCET) 5-325 mg per tablet Take 1 Tablet by mouth every four (4) hours as needed for Pain for up to 3 days. Max Daily Amount: 6 Tablets. Qty: 12 Tablet, Refills: 0    Associated Diagnoses: Closed fracture of multiple pubic rami, right, initial encounter ([facility] Closed fracture of multiple rami of left pubis, initial encounter ([facility])      acetaminophen (TYLENOL) 500 mg tablet Take 2 Tablets by mouth every eight (8) hours for 7 days, THEN 2 Tablets every twelve (12) hours for 7 days, THEN 2 Tablets every eight (8) hours for 5 days. Qty: 100 Tablet, Refills: 0    Comments: Pharmacist, Please add the following text to the patient instructions, \"Take as needed for pain in addition to any other medications ordered for pain relief. \"         CONTINUE these medications which have NOT CHANGED    Details   levothyroxine (Synthroid) 75 mcg tablet Take 75 mcg by mouth Daily (before breakfast). metoprolol succinate (TOPROL-XL) 50 mg XL tablet Take 50 mg by mouth daily. sertraline (ZOLOFT) 50 mg tablet Take 50 mg by mouth daily. STOP taking these medications       hydroCHLOROthiazide (MICROZIDE) 12.5 mg capsule Comments:   Reason for Stopping: Follow up Care:    1.  Other, MD Bee in 1-2 weeks  2. ortho    Diet:  Cardiac Diet    Disposition:  SNF. Advanced Directive:    Discharge Exam:  See today's note. CONSULTATIONS: Orthopedic Surgery    Significant Diagnostic Studies:   Recent Labs     12/01/21  0651   WBC 5.7   HGB 12.6   HCT 39.8        Recent Labs     11/29/21  0509   *   K 4.2      CO2 30   BUN 15   CREA 0.67   *   CA 8.6     No results for input(s): ALT, AP, TBIL, TBILI, TP, ALB, GLOB, GGT, AML, LPSE in the last 72 hours. No lab exists for component: SGOT, GPT, AMYP, HLPSE  No results for input(s): INR, PTP, APTT, INREXT in the last 72 hours. No results for input(s): FE, TIBC, PSAT, FERR in the last 72 hours. No results for input(s): PH, PCO2, PO2 in the last 72 hours. No results for input(s): CPK, CKMB in the last 72 hours. No lab exists for component: TROPONINI  No results found for: [address]:   1.  Closed fracture of multiple right pubic rami/ Closed nondisplaced fracture of right acetabulum. Sirena Ran management. Continue PT/OT.  evaluated by Orthopedic and outpatient f/u 1-2 wks with Dr Rodríguez. Accepted to SNF     2.   Hypertension.  on metoprolol. Continue to hold HCTZ due to hyponatremia. Added amlodipine.       3.  Hyponatremia, mild.  monitor. likely due to HCTZ. On IVF      4.  Hyperglycemia.  A1C is 5.4     5.  Leukocytosis. Likely reactive due to #1. Resolved.      6.  Dyslipidemia.  not listing meds.     7.  Anxiety disorder.  resume home meds. '     8.  Fall.  fall precaution. PT/OT           Discharged in improved condition.     Spent 35 minutes    Signed:  Dick Newton MD  12/1/2021  10:09 AM

## 2021-12-01 NOTE — PROGRESS NOTES
Went over d/c paperwork with patient. Called [facility] gave report to Nursing Supervisor Usha Choudhary).

## 2021-12-01 NOTE — PROGRESS NOTES
12/1/2021  11:39 AM  Transition of Care Plan to SNF/Rehab    SNF/Rehab Transition:  Patient has been accepted to [facility] and meets criteria for admission. Patient will transported by [facility] stretcher transport and expected to leave at 1:00 PM. Packet in chart, and PCS for attached in AllScripts. Communication to Patient/Family:  Met with patient and spoke with pt's CHILORChristal, via p/c, and they are agreeable to the transition plan. Communication to SNF/Rehab:  Bedside RN, Gallito Elliott, has been notified to update the transition plan to the facility and call report (phone number [phone], Charge nurse, [address]). Discharge information has been updated on the AVS.     Discharge instructions to be fax'd to facility via AllScripts. [] BCPI-A  Patient has not been identified as part of the BCPI-A Program.      Nursing Please include all hard scripts for controlled substances, med rec and dc summary, and AVS in packet. Nursing, please discuss the following applicable information with [address] [place] [address] nursing during report:     Gaye Jean with (X) only those applicable:    Medication:  []  Medications will be available at the facility  []  IV Antibiotics   []  Controlled Substance - hard copy to be sent with patient   []  Weekly Labs   Documents:  [] Hard RX  [] MAR  [] Kardex  [] AVS  []Transfer Summary  []Discharge   Equipment:  []  CPAP/BiPAP  []  Wound Vacuum  []  [place]ley or Urinary Device  []  PICC/Central Line  []  Nebulizer  []  Ventilator   Treatment:  []Isolation (for MRSA, VRE, etc.)  []Surgical Drain Management  []Tracheostomy Care  []Dressing Changes  []Dialysis with transportation and chair time.   []PEG Care  []Oxygen  []Daily Weights for Heart Failure   Dietary:  []Any diet limitations  []Tube Feedings   []Total Parenteral Management (TPN)   Eligible for Medicaid Long Term Services and Supports  Yes:  [] Eligible for medical assistance or will become eligible within 180 days and UAI completed. [] Provider/Patient and/or support system has requested screening. [] UAI copy provided to patient or responsible party. [] UAI unavailable at discharge will send once processed to SNF provider. [] UAI unavailable at discharged mailed to patient  No:   [x] Private pay and is not financially eligible for Medicaid within the next 180 days. [] Reside out-of-state. [] A residents of a state owned/operated facility that is licensed  by [address] [facility] or [facility]  [] Enrollment in [facility] hospice services  [] [address] [place] [address]  [x] Patient /Family declines to have screening completed or provide financial information for screening     Financial Resources:  Medicaid    [] Initiated and application pending   [] Full coverage     Advanced Care Plan:  []Surrogate Decision Maker of Care  []POA  []Communicated Code Status (DDNR\", \"Full\")    Other  Care Management Interventions  PCP Verified by CM: Yes Danitza Kinsey)  Mode of Transport at Discharge:  Other (see comment)  Transition of Care Consult (CM Consult): Discharge Planning  Physical Therapy Consult: Yes  Occupational Therapy Consult: Yes  Support Systems: Other Family Member(s)  Confirm Follow Up Transport: Family  The Plan for Transition of Care is Related to the Following Treatment Goals : Multiple pubic rami fractures, nondisplaced R acetabulum fracture  Discharge Location  Discharge Placement: Skilled nursing facility

## 2021-12-01 NOTE — DISCHARGE INSTRUCTIONS
ACUTE DIAGNOSES:  Closed fracture of multiple pubic rami ([facility] [S32.599A]    CHRONIC MEDICAL DIAGNOSES:  Problem List as of 12/1/2021 Date Reviewed: 11/25/2021          Codes Class Noted - Resolved    * (Principal) Closed fracture of multiple pubic rami [facility]) ICD-10-CM: H81.815S  ICD-9-CM: 808.2  11/25/2021 - Present              DISCHARGE MEDICATIONS:          · It is important that you take the medication exactly as they are prescribed. · Keep your medication in the bottles provided by the pharmacist and keep a list of the medication names, dosages, and times to be taken in your wallet. · Do not take other medications without consulting your doctor. DIET:  Regular Diet    ACTIVITY: Activity as tolerated    ADDITIONAL INFORMATION: If you experience any of the following symptoms then please call your primary care physician or return to the emergency room if you cannot get hold of your doctor: Fever, chills, nausea, vomiting, diarrhea, change in mentation, falling, bleeding, shortness of breath. FOLLOW UP CARE:  Primary care physician. you are to call and set up an appointment to see them in 5 days. Follow-up with orthopedics, Dr Naveen Wong in 2 weeks      Information obtained by :  I understand that if any problems occur once I am at home I am to contact my physician. I understand and acknowledge receipt of the instructions indicated above.                                                                                                                                            Physician's or R.N.'s Signature                                                                  Date/Time                                                                                                                                              Patient or Representative Signature                                                          Date/Time

## 2021-12-14 ENCOUNTER — TELEPHONE (OUTPATIENT)
Dept: FAMILY MEDICINE CLINIC | Age: 86
End: 2021-12-14

## 2021-12-14 NOTE — TELEPHONE ENCOUNTER
Pt called to advise Dr. Iwona Schroeder she missed appointment recently due to falling after Thanksgiving dinner, fractured pelvis in 3 places, and was admitted to hospital here at [facility] and was not discharged until 12/3/21. Pt now in [employer] and thought Dr. Iwona Schroeder would be notified by the hospital to cancel her appointment. Pt seen by ortho for follow up 12/10/21 and will see ortho again 1/4/22.  Kristina

## 2022-01-19 ENCOUNTER — TELEPHONE (OUTPATIENT)
Dept: FAMILY MEDICINE CLINIC | Age: 87
End: 2022-01-19

## 2022-01-19 NOTE — TELEPHONE ENCOUNTER
Ernst Humphrey with [facility] called in regards to pt. # [phone]. Ernst Humphrey accepted pt on 01/16/2022. Returned to home today and pt is having biliteral edema, +2, non pitting. Pt does not take a diuretic, but Ernst Humphrey is asking if one should be prescribed. Pt MIO is scheduled for 01/28/2022. Pt went to rehab after hospitalization in 11/2022.

## 2022-01-20 NOTE — TELEPHONE ENCOUNTER
Called pt's daughter, and she advises I call pt. Called pt on her new number [phone], and left a voice message, asking that she call the office back in regards to scheduling a virtual appointment to see Dr. Nathalia Mac tomorrow.

## 2022-01-26 ENCOUNTER — TELEPHONE (OUTPATIENT)
Dept: FAMILY MEDICINE CLINIC | Age: 87
End: 2022-01-26

## 2022-01-26 NOTE — TELEPHONE ENCOUNTER
[facility] advised that patient would like to have her visit on 01/28 at 9 am switched to a telephone visit instead of a virtual visit.

## 2022-01-28 ENCOUNTER — VIRTUAL VISIT (OUTPATIENT)
Dept: FAMILY MEDICINE CLINIC | Age: 87
End: 2022-01-28

## 2022-01-28 DIAGNOSIS — F41.9 ANXIETY: ICD-10-CM

## 2022-01-28 DIAGNOSIS — I10 PRIMARY HYPERTENSION: ICD-10-CM

## 2022-01-28 DIAGNOSIS — S32.9XXA CLOSED NONDISPLACED FRACTURE OF PELVIS, UNSPECIFIED PART OF PELVIS, INITIAL ENCOUNTER (HCC): ICD-10-CM

## 2022-01-28 DIAGNOSIS — R60.0 BILATERAL LEG EDEMA: ICD-10-CM
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
## 2022-01-28 DIAGNOSIS — Z09 HOSPITAL DISCHARGE FOLLOW-UP: Primary | ICD-10-CM

## 2022-01-28 DIAGNOSIS — E87.1 HYPONATREMIA: ICD-10-CM

## 2022-01-28 PROCEDURE — 99443 PR PHYS/QHP TELEPHONE EVALUATION 21-30 MIN: CPT | Performed by: FAMILY MEDICINE

## 2022-01-28 PROCEDURE — 1111F DSCHRG MED/CURRENT MED MERGE: CPT | Performed by: FAMILY MEDICINE

## 2022-01-28 RX ORDER — MENTHOL, UNSPECIFIED FORM 50 MG/ML
GEL TOPICAL
COMMUNITY

## 2022-01-28 RX ORDER — AMLODIPINE BESYLATE 10 MG/1
10 TABLET ORAL DAILY
COMMUNITY
End: 2022-01-28

## 2022-01-28 RX ORDER — METOPROLOL SUCCINATE 50 MG/1
50 TABLET, EXTENDED RELEASE ORAL DAILY
Qty: 90 TABLET | Refills: 0 | Status: SHIPPED | OUTPATIENT
Start: 2022-01-28 | End: 2022-03-11

## 2022-01-28 RX ORDER — METOPROLOL SUCCINATE 25 MG/1
25 TABLET, EXTENDED RELEASE ORAL DAILY
COMMUNITY
End: 2022-01-28

## 2022-01-28 RX ORDER — AMLODIPINE BESYLATE 5 MG/1
5 TABLET ORAL DAILY
Qty: 90 TABLET | Refills: 0 | Status: SHIPPED | OUTPATIENT
Start: 2022-01-28 | End: 2022-03-11

## 2022-01-28 RX ORDER — CALCIUM CARBONATE/VITAMIN D3 600 MG-20
TABLET ORAL
COMMUNITY
Start: 2021-12-12

## 2022-01-28 NOTE — PROGRESS NOTES
Chepe Peterson is a 80 y.o. female evaluated via telephone on 2022. Consent:  She and/or health care decision maker is aware that she may receive a bill for this telephone service, depending on her insurance coverage, and has provided verbal consent to proceed: Yes      Documentation:  I communicated with the patient and/or health care decision maker about her hospital admission. Details of this discussion including any medical advice provided:         Virtual Audio Transitional Care Management Progress Note    Patient: Chepe Peterson  : 1932  PCP: Robb Farmer MD    Date of office visit: 2022   Date of admission: 21  Date of discharge: 21   Date of rehab discharge:1/15/22  Hospital: [facility]       Call initiated w/i 2 business dates of discharge: *No response documented in the last 14 days   Date of the most recent call to the patient: *No documented post hospital discharge outreach found in the last 14 days      Assessment/Plan:     Diagnoses and all orders for this visit:    1. Hospital discharge follow-up  -     [place] DISCHARGE MEDS RECONCILED W/ CURRENT OUTPATIENT MED LIST  -     amLODIPine (Norvasc) 5 mg tablet; Take 1 Tablet by mouth daily. -     metoprolol succinate (Toprol XL) 50 mg XL tablet; Take 1 Tablet by mouth daily. 2. Closed nondisplaced fracture of pelvis, unspecified part of pelvis, initial encounter ([facility] [address]    3. Bilateral leg edema    4. Primary hypertension  -     METABOLIC PANEL, BASIC; Future    5. Hyponatremia  -     METABOLIC PANEL, BASIC; Future    6. Fall, initial encounter    7. Anxiety        Doing well, still needs a walker  Edema likely due to stopping hydrochlorothiazide and add Norvasc  Labs per orders. Decrease Norvasc  Increase Toprol XL    Follow-up and Dispositions    · Return in about 6 weeks (around 3/11/2022) for blood pressure, edema - in office.           Subjective:   Chepe Peterson is a 80 y.o. female presenting today for follow-up after hospital discharge. This encounter and supporting documentation was reviewed if available. Medication reconciliation was performed today. The main problem requiring admission was a closed fracture of multiple right pubic rami and a closed nondisplaced fracture of the right acetabulum. Complications during admission: [address]:   1.  Closed fracture of multiple right pubic rami/ Closed nondisplaced fracture of right acetabulum. [facility] management. Continue PT/OT.  evaluated by Orthopedic and outpatient f/u 1-2 wks with Dr Rodríguez. Accepted to SNF     2.   Hypertension.  on metoprolol. Continue to hold HCTZ due to hyponatremia. Added amlodipine.       3.  Hyponatremia, mild.  monitor. likely due to HCTZ. On IVF      4.  Hyperglycemia.  A1C is 5.4     5.  Leukocytosis. Likely reactive due to #1. Resolved.      6.  Dyslipidemia.  not listing meds.     7.  Anxiety disorder.  resume home meds. '     8.  Fall.  fall precaution. PT/OT           Interval history/Current status: she is doing very well. Home health is coming for OT, PT and nursing. Currently, she is using a walker. She has moved into an adult community in the [address] [place] [address]. Other concerns include swelling in her feet and ankles and her medication adjustments. The swelling has been present since rehab and has not changed. Her hydrochlorothiazide was stopped, due to hyponatremia, and Norvasc was added. Admitting symptoms have: significantly improved      Medications marked \"taking\" at this time:  Prior to Admission medications    Medication Sig Start Date End Date Taking? Authorizing Provider   Caltrate with Vitamin D3 600 mg-20 mcg (800 unit) tab TAKE 1 TABLET BY MOUTH TWICE DAILY WITH FOOD 12/12/21  Yes Provider, Historical   menthol (Biofreeze, menthol,) 5 % gel by Apply Externally route. Yes Provider, Historical   metoprolol succinate (Toprol XL) 25 mg XL tablet Take 25 mg by mouth daily.    Yes Provider, Historical   amLODIPine (Norvasc) 10 mg tablet Take 10 mg by mouth daily. Yes Provider, Historical   levothyroxine (Synthroid) 75 mcg tablet Take 75 mcg by mouth Daily (before breakfast). Yes Provider, Historical   sertraline (ZOLOFT) 50 mg tablet Take 50 mg by mouth daily. Yes Provider, Historical   Synthroid 75 mcg tablet TAKE 1 TABLET BY MOUTH DAILY 11/24/21  Yes Tree Jorgensen MD   acetaminophen (TylenoL) 325 mg tablet Take  by mouth every four (4) hours as needed for Pain. Yes Provider, Historical   amLODIPine (NORVASC) 5 mg tablet Take 1 Tablet by mouth daily. Patient not taking: Reported on 1/28/2022 12/1/21 1/28/22  Kennedy Keith MD   metoprolol succinate (TOPROL-XL) 50 mg XL tablet Take 50 mg by mouth daily. Patient not taking: Reported on 1/28/2022 1/28/22  Provider, Historical   sertraline (Zoloft) 50 mg tablet Take 1 Tablet by mouth daily. Patient not taking: Reported on 1/28/2022 9/20/21 1/28/22  Tree Jorgensen MD   metoprolol succinate (TOPROL-XL) 50 mg XL tablet TAKE 1 TABLET BY MOUTH EVERY DAY  Patient not taking: Reported on 1/28/2022 9/7/21 1/28/22  Tree Jorgensen MD   flunisolide (NASAREL) 25 mcg (0.025 %) spry 2 Sprays by Both Nostrils route two (2) times a day. Patient not taking: Reported on 1/28/2022 9/1/21 1/28/22  Tree Jorgensen MD   LORazepam (ATIVAN) 0.5 mg tablet Take 1 Tablet by mouth every six (6) hours as needed for Anxiety. Max Daily Amount: 2 mg. Patient not taking: Reported on 1/28/2022 8/30/21 1/28/22  Tree Jorgensen MD   hydroCHLOROthiazide (MICROZIDE) 12.5 mg capsule TAKE 1 CAPSULE BY MOUTH EVERY DAY IN THE MORNING  Patient not taking: Reported on 1/28/2022 8/30/21 1/28/22  Tree Jorgensen MD   lactobacillus combination no.4 (Probiotic) 3 billion cell cap Take  by mouth. Patient not taking: Reported on 1/28/2022 8/3/21 1/28/22  Tree Jorgensen MD        Review of Systems   Constitutional: Negative for weight loss.         No weight gain   Eyes: Negative for blurred vision. Respiratory: Negative for shortness of breath. Cardiovascular: Positive for leg swelling. Negative for chest pain. Neurological: Negative for dizziness, sensory change, speech change, focal weakness and headaches. Objective:     Patient-Reported Vitals 1/28/2022   Patient-Reported Weight 123lb   Patient-Reported Systolic  (No Data)        Physical Exam   Deferred as this was an audio visit    We discussed the expected course, resolution and complications of the diagnosis(es) in detail. Medication risks, benefits, costs, interactions, and alternatives were discussed as indicated. I advised her to contact the office if her condition worsens, changes or fails to improve as anticipated. She expressed understanding with the diagnosis(es) and plan. I affirm this is a Patient Initiated Episode with an Established Patient who has not had a related appointment within my department in the past 7 days or scheduled within the next 24 hours.     Total Time: minutes: 21-30 minutes    Note: not billable if this call serves to triage the patient into an appointment for the relevant concern      Felix Michelle MD

## 2022-01-28 NOTE — PROGRESS NOTES
Chief Complaint   Patient presents with   [facility] Follow Up    Medication Evaluation     Wants to discuss medication changes.  Foot Swelling     Bilateral.     1. Have you been to the ER, urgent care clinic since your last visit? Hospitalized since your last visit? Yes, 11/25/21 [facility], broken pelvis. 2. Have you seen or consulted any other health care providers outside of the [address] [place] [address] since your last visit? Include any pap smears or colon screening.  No

## 2022-01-31 NOTE — PROGRESS NOTES
Subjective:   Eli King was seen for Hospital Follow Up, Medication Evaluation (Wants to discuss medication changes.), and Foot Swelling (Bilateral.)      HPI        ROS      Objective:         Assessment & Plan:   Diagnoses and all orders for this visit:    1. Hospital discharge follow-up  -     [place] DISCHARGE MEDS RECONCILED W/ CURRENT OUTPATIENT MED LIST  -     amLODIPine (Norvasc) 5 mg tablet; Take 1 Tablet by mouth daily. -     metoprolol succinate (Toprol XL) 50 mg XL tablet; Take 1 Tablet by mouth daily. 2. Closed nondisplaced fracture of pelvis, unspecified part of pelvis, initial encounter ([facility] [address]    3. Bilateral leg edema    4. Primary hypertension  -     METABOLIC PANEL, BASIC; Future    5. Hyponatremia  -     METABOLIC PANEL, BASIC; Future    6. Fall, initial encounter    7. Anxiety        ***    {Assessment and Plan:11021}      Reviewed plan of care. Patient has provided input and agrees with goals. I {AFFIRM/DO NOT AFFIRM:03418::\"affirm\"} this is a Patient Initiated Episode with an Established Patient who has not had a related appointment within my department in the past 7 days or scheduled within the next 24 hours.     Total Time: {minutes:51686::\"5-10 minutes\"}    Note: not billable if this call serves to triage the patient into an appointment for the relevant concern      Lennie Rodríguez MD

## 2022-03-09 DIAGNOSIS — Z09 HOSPITAL DISCHARGE FOLLOW-UP: ICD-10-CM

## 2022-03-11 RX ORDER — METOPROLOL SUCCINATE 50 MG/1
50 TABLET, EXTENDED RELEASE ORAL DAILY
Qty: 90 TABLET | Refills: 0 | Status: SHIPPED | OUTPATIENT
Start: 2022-03-11

## 2022-03-11 RX ORDER — AMLODIPINE BESYLATE 5 MG/1
5 TABLET ORAL DAILY
Qty: 90 TABLET | Refills: 0 | Status: SHIPPED | OUTPATIENT
Start: 2022-03-11

## 2022-03-14 DIAGNOSIS — E03.9 ACQUIRED HYPOTHYROIDISM: ICD-10-CM

## 2022-03-14 RX ORDER — LEVOTHYROXINE SODIUM 75 UG/1
75 TABLET ORAL DAILY
Qty: 90 TABLET | Refills: 0 | Status: SHIPPED | OUTPATIENT
Start: 2022-03-14

## 2022-03-19 PROBLEM — M81.0 AGE-RELATED OSTEOPOROSIS WITHOUT CURRENT PATHOLOGICAL FRACTURE: Status: ACTIVE | Noted: 2021-08-30

## 2022-03-19 PROBLEM — E03.9 ACQUIRED HYPOTHYROIDISM: Status: ACTIVE | Noted: 2021-08-30

## 2022-03-19 PROBLEM — S32.599A CLOSED FRACTURE OF MULTIPLE PUBIC RAMI (HCC): Status: ACTIVE | Noted: 2021-11-25

## 2022-05-06 RX ORDER — SERTRALINE HYDROCHLORIDE 50 MG/1
50 TABLET, FILM COATED ORAL EVERY EVENING
Qty: 90 TABLET | Refills: 2 | Status: SHIPPED | OUTPATIENT
Start: 2022-05-06

## 2022-07-16 DIAGNOSIS — Z09 HOSPITAL DISCHARGE FOLLOW-UP: ICD-10-CM

## 2022-07-16 RX ORDER — AMLODIPINE BESYLATE 5 MG/1
5 TABLET ORAL DAILY
Qty: 90 TABLET | Refills: 0 | OUTPATIENT
Start: 2022-07-16

## 2023-12-11 ENCOUNTER — NURSE TRIAGE (OUTPATIENT)
Dept: OTHER | Facility: CLINIC | Age: 88
End: 2023-12-11

## 2023-12-11 NOTE — TELEPHONE ENCOUNTER
[facility]    [place] [facility] with [employer]. Practice: [facility] in March is her est appt    Subjective: Caller states \"RED FLAG:  Bacteria infection, bronchitis; [facility]; poss allergy to doxycycline\"     Current Symptoms:   - hand swelling, redness, puffiness around joint  - during the course of the abx, she did have some emesis     Onset: 3 days      Pain Severity:   no pain, no itching    Temperature: no fever      What has been tried: been on doxycycline    Pregnant: NA    Recommended disposition: see in office today; unestablished; [facility]    Intended disposition:  [facility] [address]; pt request to speak to  regarding possible cancellations    Warm transfer to:  Pt disconnected the call prior to transfer    Care advice provided, patient verbalizes understanding; denies any other questions or concerns; instructed to call back for any new or worsening symptoms. Attention Provider: Thank you for allowing me to participate in the care of your patient. The patient was connected to triage in response to information provided to the ECC/PSC. Please do not respond through this encounter as the response is not directed to a shared pool.     Reason for Disposition   Patient wants to be seen    Protocols used: Rash or Redness - Localized-ADULT-[place]

## 2024-02-28 ENCOUNTER — TELEPHONE (OUTPATIENT)
Age: 89
End: 2024-02-28

## 2024-02-28 NOTE — TELEPHONE ENCOUNTER
----- Message from Milady Patel sent at 2/28/2024 11:46 AM EST -----  Subject: Message to Provider    QUESTIONS  Information for Provider? patient has an establishing care appt with Dr Jones scheduled on 3/27 and uses transport service. Patient needs to   reschedule this appt to a Tues or a Thurs when transportation is available  ---------------------------------------------------------------------------  --------------  CALL BACK INFO  [phone]; OK to leave message on voicemail  ---------------------------------------------------------------------------  --------------  SCRIPT ANSWERS  Relationship to Patient? Self

## 2024-02-29 NOTE — TELEPHONE ENCOUNTER
Spoke to pt over the phone and inform her if she would like to reschedule appt she has  3/27/24 with Dr. Jones the next would be late June. (Pt stated its hard getting transportation). Pt said she will call someone to see if they can take her to her appt on 3/27/24 and to leave it for now.-TM 2/29/24

## 2024-06-27 ENCOUNTER — OFFICE VISIT (OUTPATIENT)
Age: 89
End: 2024-06-27
Payer: MEDICARE

## 2024-06-27 VITALS
WEIGHT: 123.6 LBS | OXYGEN SATURATION: 99 % | TEMPERATURE: 97 F | BODY MASS INDEX: 24.92 KG/M2 | HEIGHT: 59 IN | RESPIRATION RATE: 14 BRPM | HEART RATE: 79 BPM | DIASTOLIC BLOOD PRESSURE: 63 MMHG | SYSTOLIC BLOOD PRESSURE: 168 MMHG

## 2024-06-27 DIAGNOSIS — E06.3 HYPOTHYROIDISM DUE TO HASHIMOTO'S THYROIDITIS: ICD-10-CM

## 2024-06-27 DIAGNOSIS — H91.93 BILATERAL HEARING LOSS, UNSPECIFIED HEARING LOSS TYPE: ICD-10-CM

## 2024-06-27 DIAGNOSIS — E03.8 HYPOTHYROIDISM DUE TO HASHIMOTO'S THYROIDITIS: ICD-10-CM

## 2024-06-27 DIAGNOSIS — Z76.89 ENCOUNTER TO ESTABLISH CARE: Primary | ICD-10-CM

## 2024-06-27 DIAGNOSIS — F41.9 ANXIETY: ICD-10-CM

## 2024-06-27 DIAGNOSIS — N81.10 CYSTOCELE, UNSPECIFIED: ICD-10-CM

## 2024-06-27 DIAGNOSIS — I10 PRIMARY HYPERTENSION: ICD-10-CM

## 2024-06-27 DIAGNOSIS — M81.0 OSTEOPOROSIS, UNSPECIFIED OSTEOPOROSIS TYPE, UNSPECIFIED PATHOLOGICAL FRACTURE PRESENCE: ICD-10-CM

## 2024-06-27 LAB
ALBUMIN SERPL-MCNC: 4 G/DL (ref 3.5–5)
ALBUMIN/GLOB SERPL: 1 (ref 1.1–2.2)
ALP SERPL-CCNC: 102 U/L (ref 45–117)
ALT SERPL-CCNC: 14 U/L (ref 12–78)
ANION GAP SERPL CALC-SCNC: 9 MMOL/L (ref 5–15)
AST SERPL-CCNC: 14 U/L (ref 15–37)
BILIRUB SERPL-MCNC: 0.7 MG/DL (ref 0.2–1)
BUN SERPL-MCNC: 19 MG/DL (ref 6–20)
BUN/CREAT SERPL: 20 (ref 12–20)
CALCIUM SERPL-MCNC: 9.5 MG/DL (ref 8.5–10.1)
CHLORIDE SERPL-SCNC: 102 MMOL/L (ref 97–108)
CO2 SERPL-SCNC: 25 MMOL/L (ref 21–32)
CREAT SERPL-MCNC: 0.96 MG/DL (ref 0.55–1.02)
GLOBULIN SER CALC-MCNC: 4.1 G/DL (ref 2–4)
GLUCOSE SERPL-MCNC: 97 MG/DL (ref 65–100)
POTASSIUM SERPL-SCNC: 4.3 MMOL/L (ref 3.5–5.1)
PROT SERPL-MCNC: 8.1 G/DL (ref 6.4–8.2)
SODIUM SERPL-SCNC: 136 MMOL/L (ref 136–145)
TSH SERPL DL<=0.05 MIU/L-ACNC: 8.22 UIU/ML (ref 0.36–3.74)

## 2024-06-27 PROCEDURE — 99204 OFFICE O/P NEW MOD 45 MIN: CPT | Performed by: STUDENT IN AN ORGANIZED HEALTH CARE EDUCATION/TRAINING PROGRAM

## 2024-06-27 PROCEDURE — G2211 COMPLEX E/M VISIT ADD ON: HCPCS | Performed by: STUDENT IN AN ORGANIZED HEALTH CARE EDUCATION/TRAINING PROGRAM

## 2024-06-27 PROCEDURE — G8427 DOCREV CUR MEDS BY ELIG CLIN: HCPCS | Performed by: STUDENT IN AN ORGANIZED HEALTH CARE EDUCATION/TRAINING PROGRAM

## 2024-06-27 PROCEDURE — 1036F TOBACCO NON-USER: CPT | Performed by: STUDENT IN AN ORGANIZED HEALTH CARE EDUCATION/TRAINING PROGRAM

## 2024-06-27 PROCEDURE — 1123F ACP DISCUSS/DSCN MKR DOCD: CPT | Performed by: STUDENT IN AN ORGANIZED HEALTH CARE EDUCATION/TRAINING PROGRAM

## 2024-06-27 PROCEDURE — G8420 CALC BMI NORM PARAMETERS: HCPCS | Performed by: STUDENT IN AN ORGANIZED HEALTH CARE EDUCATION/TRAINING PROGRAM

## 2024-06-27 PROCEDURE — 1090F PRES/ABSN URINE INCON ASSESS: CPT | Performed by: STUDENT IN AN ORGANIZED HEALTH CARE EDUCATION/TRAINING PROGRAM

## 2024-06-27 RX ORDER — METOPROLOL SUCCINATE 50 MG/1
50 TABLET, EXTENDED RELEASE ORAL DAILY
Qty: 30 TABLET | Refills: 5 | Status: SHIPPED | OUTPATIENT
Start: 2024-06-27

## 2024-06-27 RX ORDER — AMLODIPINE BESYLATE 10 MG/1
10 TABLET ORAL DAILY
COMMUNITY
Start: 2024-05-16 | End: 2024-06-27 | Stop reason: SDUPTHER

## 2024-06-27 RX ORDER — DULOXETIN HYDROCHLORIDE 20 MG/1
20 CAPSULE, DELAYED RELEASE ORAL DAILY
Qty: 30 CAPSULE | Refills: 3 | Status: SHIPPED | OUTPATIENT
Start: 2024-06-27

## 2024-06-27 RX ORDER — LEVOTHYROXINE SODIUM 0.07 MG/1
75 TABLET ORAL DAILY
Qty: 30 TABLET | Refills: 5 | Status: SHIPPED | OUTPATIENT
Start: 2024-06-27 | End: 2024-06-28 | Stop reason: DRUGHIGH

## 2024-06-27 RX ORDER — DULOXETIN HYDROCHLORIDE 30 MG/1
30 CAPSULE, DELAYED RELEASE ORAL DAILY
Qty: 90 CAPSULE | Refills: 1 | Status: CANCELLED | OUTPATIENT
Start: 2024-06-27

## 2024-06-27 RX ORDER — AMLODIPINE BESYLATE 10 MG/1
10 TABLET ORAL DAILY
Qty: 30 TABLET | Refills: 5 | Status: SHIPPED | OUTPATIENT
Start: 2024-06-27

## 2024-06-27 SDOH — ECONOMIC STABILITY: FOOD INSECURITY: WITHIN THE PAST 12 MONTHS, THE FOOD YOU BOUGHT JUST DIDN'T LAST AND YOU DIDN'T HAVE MONEY TO GET MORE.: NEVER TRUE

## 2024-06-27 SDOH — ECONOMIC STABILITY: HOUSING INSECURITY
IN THE LAST 12 MONTHS, WAS THERE A TIME WHEN YOU DID NOT HAVE A STEADY PLACE TO SLEEP OR SLEPT IN A SHELTER (INCLUDING NOW)?: NO

## 2024-06-27 SDOH — ECONOMIC STABILITY: FOOD INSECURITY: WITHIN THE PAST 12 MONTHS, YOU WORRIED THAT YOUR FOOD WOULD RUN OUT BEFORE YOU GOT MONEY TO BUY MORE.: NEVER TRUE

## 2024-06-27 SDOH — ECONOMIC STABILITY: INCOME INSECURITY: HOW HARD IS IT FOR YOU TO PAY FOR THE VERY BASICS LIKE FOOD, HOUSING, MEDICAL CARE, AND HEATING?: NOT HARD AT ALL

## 2024-06-27 ASSESSMENT — PATIENT HEALTH QUESTIONNAIRE - PHQ9
2. FEELING DOWN, DEPRESSED OR HOPELESS: NOT AT ALL
SUM OF ALL RESPONSES TO PHQ QUESTIONS 1-9: 0
SUM OF ALL RESPONSES TO PHQ9 QUESTIONS 1 & 2: 0
SUM OF ALL RESPONSES TO PHQ QUESTIONS 1-9: 0
1. LITTLE INTEREST OR PLEASURE IN DOING THINGS: NOT AT ALL
SUM OF ALL RESPONSES TO PHQ QUESTIONS 1-9: 0
SUM OF ALL RESPONSES TO PHQ QUESTIONS 1-9: 0

## 2024-06-27 NOTE — PATIENT INSTRUCTIONS
Follow up with Dr. Núñez in August or later     Take 1/2 pill of Sertraline for 1 week then stop it and start the Duloxetine

## 2024-06-27 NOTE — PROGRESS NOTES
Chief Complaint   Patient presents with    New Patient     Previous PCP: Lives at [facility] - moved from [place]      \"Have you been to the ER, urgent care clinic since your last visit?  Hospitalized since your last visit?\"    [facility] - PCP  [facility] - anxiety/elevated BP - March 2024    “Have you seen or consulted any other health care providers outside of [facility] since your last visit?”    [facility] for allergies  Dr. Chelo Copeland                     6/27/2024     1:11 PM   PHQ-9    Little interest or pleasure in doing things 0   Feeling down, depressed, or hopeless 0   PHQ-2 Score 0   PHQ-9 Total Score 0           Financial Resource Strain: Low Risk  (6/27/2024)    Overall Financial Resource Strain (CARDIA)     Difficulty of Paying Living Expenses: Not hard at all      Food Insecurity: No Food Insecurity (6/27/2024)    Hunger Vital Sign     Worried About Running Out of Food in the Last Year: Never true     Ran Out of Food in the Last Year: Never true          Health Maintenance Due   Topic Date Due    Depression Screen  Never done    DTaP/Tdap/Td vaccine (1 - Tdap) Never done    Shingles vaccine (1 of 2) Never done    Respiratory Syncytial Virus (RSV) Pregnant or age 60 yrs+ (1 - 1-dose 60+ series) Never done    Pneumococcal 65+ years Vaccine (1 of 1 - PCV) Never done    COVID-19 Vaccine (7 - 2023-24 season) 12/06/2023      
tinnitus   Osteoporosis: Sees Rheum Dr. Manisha Fields on [facility]    Hx of pelvic fracture: sees Dr. Copeland  Cystocele        Health Maintenance:  Health Maintenance Due   Topic Date Due    Depression Screen  Never done    DTaP/Tdap/Td vaccine (1 - Tdap) Never done    Shingles vaccine (1 of 2) Never done    Respiratory Syncytial Virus (RSV) Pregnant or age 60 yrs+ (1 - 1-dose 60+ series) Never done    Pneumococcal 65+ years Vaccine (1 of 1 - PCV) Never done    COVID-19 Vaccine (7 - 2023-24 season) 12/06/2023        ROS:   See HPI    Allergies personally reviewed.  Allergies   Allergen Reactions    Fentanyl Other (See Comments)     Other reaction(s): Unknown (comments)  Duragesic patch-- allergy as well      Gabapentin Other (See Comments)     Confusion           Objective:   Vitals reviewed.  BP (!) 168/63 (Site: Right Upper Arm, Position: Sitting, Cuff Size: Small Adult)   Pulse 79   Temp 97 °F (36.1 °C) (Temporal)   Resp 14   Ht 1.499 m (4' 11\")   Wt 56.1 kg (123 lb 9.6 oz)   SpO2 99%   BMI 24.96 kg/m²      Wt Readings from Last 3 Encounters:   06/27/24 56.1 kg (123 lb 9.6 oz)   09/20/21 56.2 kg (124 lb)   08/30/21 57.6 kg (127 lb)        Physical Exam    Vitals: Reviewed.   General: AO x 3. No distress. Not diaphoretic. No jaundice. No cyanosis. No pallor.  HEENT: No thyromegaly or JVD  Cardio: Normal rate, regular rhythm. No murmur, rubs, or gallop.   Pulmonary: Effort normal. No accessory muscle use. No wheezes, rales, or rhonchi.    Extremities: mild non pitting edema bl LE. Pulses 2+.    Skin: No rash.      I have reviewed pertinent labs results and other data. I have discussed the diagnosis with the patient and the intended plan as seen in the above orders. The patient has received an after-visit summary and questions were answered concerning future plans. I have discussed medication side effects and warnings with the patient as well.    Riddhi Jones, DO  06/27/24

## 2024-06-28 ENCOUNTER — TELEPHONE (OUTPATIENT)
Age: 89
End: 2024-06-28

## 2024-06-28 DIAGNOSIS — E03.9 ACQUIRED HYPOTHYROIDISM: Primary | ICD-10-CM

## 2024-06-28 RX ORDER — LEVOTHYROXINE SODIUM 88 UG/1
88 TABLET ORAL DAILY
Qty: 90 TABLET | Refills: 1 | Status: SHIPPED | OUTPATIENT
Start: 2024-06-28

## 2024-06-28 NOTE — TELEPHONE ENCOUNTER
Called patient. Two patient identifiers verified. Discussed lab results per provider's note. Verbalized understanding.    Pham Camarena [facility]

## 2024-06-28 NOTE — TELEPHONE ENCOUNTER
----- Message from Riddhi Jones DO sent at 6/28/2024  8:39 AM EDT -----  Could you call patient and let her know that her thyroid level was off and I increased her Synthroid to 88 mcg daily.  Also sending a letter with her lab results

## 2024-07-16 ENCOUNTER — TELEPHONE (OUTPATIENT)
Age: 89
End: 2024-07-16

## 2024-07-16 NOTE — TELEPHONE ENCOUNTER
Called patient. Two patient identifiers verified. Discussed lab results per provider's note. Answered all questions. Patient verbalized understanding.     Pham Camarena CMA

## 2024-07-16 NOTE — TELEPHONE ENCOUNTER
Patient is requesting provider give her a call to talk about her lab results. Also, patient is aware of provider leaving, and has been referred to . The front will call her to her scheduled with him when his schedule becomes available.

## 2024-08-09 ENCOUNTER — TELEPHONE (OUTPATIENT)
Age: 89
End: 2024-08-09

## 2024-08-09 NOTE — TELEPHONE ENCOUNTER
I contacted this patient, her name &  were verified.  I informed patient that her AWV is overdue, and asked if she would like to be scheduled for said visit. Patient said she is not familiar with the AWV, but consented to the appointment for 2024.    Patient has concerns about her medications, and other than Levothyroxine (Synthroid), she has not taken her other prescriptions by the previous provider due to not having a new provider. Said if she had reactions to the new meds. while taking them, she had no provider. She is waiting to establish care with new provider before taking more medications.

## 2024-08-13 ENCOUNTER — OFFICE VISIT (OUTPATIENT)
Age: 89
End: 2024-08-13
Payer: MEDICARE

## 2024-08-13 VITALS
WEIGHT: 121.8 LBS | BODY MASS INDEX: 24.56 KG/M2 | HEIGHT: 59 IN | DIASTOLIC BLOOD PRESSURE: 91 MMHG | HEART RATE: 83 BPM | SYSTOLIC BLOOD PRESSURE: 181 MMHG | OXYGEN SATURATION: 91 % | RESPIRATION RATE: 12 BRPM | TEMPERATURE: 98.6 F

## 2024-08-13 DIAGNOSIS — I10 PRIMARY HYPERTENSION: Primary | ICD-10-CM

## 2024-08-13 DIAGNOSIS — F41.9 ANXIETY: ICD-10-CM

## 2024-08-13 DIAGNOSIS — E03.9 ACQUIRED HYPOTHYROIDISM: ICD-10-CM

## 2024-08-13 PROCEDURE — 99214 OFFICE O/P EST MOD 30 MIN: CPT | Performed by: STUDENT IN AN ORGANIZED HEALTH CARE EDUCATION/TRAINING PROGRAM

## 2024-08-13 PROCEDURE — 1123F ACP DISCUSS/DSCN MKR DOCD: CPT | Performed by: STUDENT IN AN ORGANIZED HEALTH CARE EDUCATION/TRAINING PROGRAM

## 2024-08-13 RX ORDER — METOPROLOL SUCCINATE 50 MG/1
50 TABLET, EXTENDED RELEASE ORAL DAILY
Qty: 90 TABLET | Refills: 3 | Status: SHIPPED | OUTPATIENT
Start: 2024-08-13

## 2024-08-13 RX ORDER — LEVOTHYROXINE SODIUM 88 UG/1
88 TABLET ORAL DAILY
Qty: 90 TABLET | Refills: 1 | Status: SHIPPED | OUTPATIENT
Start: 2024-08-13

## 2024-08-13 RX ORDER — AMLODIPINE BESYLATE 10 MG/1
10 TABLET ORAL DAILY
Qty: 30 TABLET | Refills: 1 | Status: SHIPPED | OUTPATIENT
Start: 2024-08-13

## 2024-08-13 ASSESSMENT — LIFESTYLE VARIABLES
HOW OFTEN DURING THE LAST YEAR HAVE YOU HAD A FEELING OF GUILT OR REMORSE AFTER DRINKING: NEVER
HAS A RELATIVE, FRIEND, DOCTOR, OR ANOTHER HEALTH PROFESSIONAL EXPRESSED CONCERN ABOUT YOUR DRINKING OR SUGGESTED YOU CUT DOWN: NO
HOW OFTEN DURING THE LAST YEAR HAVE YOU BEEN UNABLE TO REMEMBER WHAT HAPPENED THE NIGHT BEFORE BECAUSE YOU HAD BEEN DRINKING: NEVER
HOW MANY STANDARD DRINKS CONTAINING ALCOHOL DO YOU HAVE ON A TYPICAL DAY: 1 OR 2
HAVE YOU OR SOMEONE ELSE BEEN INJURED AS A RESULT OF YOUR DRINKING: NO
HOW OFTEN DURING THE LAST YEAR HAVE YOU NEEDED AN ALCOHOLIC DRINK FIRST THING IN THE MORNING TO GET YOURSELF GOING AFTER A NIGHT OF HEAVY DRINKING: NEVER
HOW OFTEN DURING THE LAST YEAR HAVE YOU FOUND THAT YOU WERE NOT ABLE TO STOP DRINKING ONCE YOU HAD STARTED: NEVER
HOW OFTEN DURING THE LAST YEAR HAVE YOU FAILED TO DO WHAT WAS NORMALLY EXPECTED FROM YOU BECAUSE OF DRINKING: NEVER
HOW OFTEN DO YOU HAVE A DRINK CONTAINING ALCOHOL: 4 OR MORE TIMES A WEEK

## 2024-08-13 ASSESSMENT — PATIENT HEALTH QUESTIONNAIRE - PHQ9
SUM OF ALL RESPONSES TO PHQ QUESTIONS 1-9: 0
SUM OF ALL RESPONSES TO PHQ QUESTIONS 1-9: 0
2. FEELING DOWN, DEPRESSED OR HOPELESS: NOT AT ALL
SUM OF ALL RESPONSES TO PHQ9 QUESTIONS 1 & 2: 0
SUM OF ALL RESPONSES TO PHQ QUESTIONS 1-9: 0
1. LITTLE INTEREST OR PLEASURE IN DOING THINGS: NOT AT ALL
SUM OF ALL RESPONSES TO PHQ QUESTIONS 1-9: 0

## 2024-08-13 NOTE — ASSESSMENT & PLAN NOTE
Uncontrolled, will continue metoprolol 50 mg daily and restart amlodipine 10 mg daily and follow-up in 1 to 2 weeks.    Orders:    amLODIPine (NORVASC) 10 MG tablet; Take 1 tablet by mouth daily    metoprolol succinate (TOPROL XL) 50 MG extended release tablet; Take 1 tablet by mouth daily

## 2024-08-13 NOTE — ASSESSMENT & PLAN NOTE
Uncontrolled, Synthroid dose increased last visit but patient has not started the higher dose so we will represcribe this medication and follow-up with a recheck of TSH in 6 weeks.    Orders:    levothyroxine (SYNTHROID) 88 MCG tablet; Take 1 tablet by mouth daily

## 2024-08-13 NOTE — PROGRESS NOTES
Chief Complaint   Patient presents with    Medicare AWV    Establish Care     Dr. Jones patient         \"Have you been to the ER, urgent care clinic since your last visit?  Hospitalized since your last visit?\"    [facility] - March 9th - high BP and afraid of stroke, heart attack, clots    “Have you seen or consulted any other health care providers outside of [facility] since your last visit?”    Previous PCP - Karen            Click Here for Release of Records Request           6/27/2024     1:11 PM   PHQ-9    Little interest or pleasure in doing things 0   Feeling down, depressed, or hopeless 0   PHQ-2 Score 0   PHQ-9 Total Score 0           Financial Resource Strain: Low Risk  (6/27/2024)    Overall Financial Resource Strain (CARDIA)     Difficulty of Paying Living Expenses: Not hard at all      Food Insecurity: No Food Insecurity (6/27/2024)    Hunger Vital Sign     Worried About Running Out of Food in the Last Year: Never true     Ran Out of Food in the Last Year: Never true          Health Maintenance Due   Topic Date Due    DTaP/Tdap/Td vaccine (1 - Tdap) Never done    Shingles vaccine (1 of 2) Never done    Respiratory Syncytial Virus (RSV) Pregnant or age 60 yrs+ (1 - 1-dose 60+ series) Never done    Pneumococcal 65+ years Vaccine (1 of 1 - PCV) Never done    COVID-19 Vaccine (7 - 2023-24 season) 12/06/2023    Annual Wellness Visit (Medicare)  Never done    Flu vaccine (1) 08/01/2024

## 2024-08-13 NOTE — ASSESSMENT & PLAN NOTE
Uncontrolled, patient was previously prescribed Cymbalta 20 mg daily but has not started this medication due to concern for side effects.  Advised patient to start this medication today.

## 2024-08-20 ENCOUNTER — OFFICE VISIT (OUTPATIENT)
Age: 89
End: 2024-08-20
Payer: MEDICARE

## 2024-08-20 VITALS
TEMPERATURE: 98 F | BODY MASS INDEX: 24.39 KG/M2 | HEART RATE: 74 BPM | OXYGEN SATURATION: 97 % | HEIGHT: 59 IN | WEIGHT: 121 LBS | DIASTOLIC BLOOD PRESSURE: 66 MMHG | RESPIRATION RATE: 16 BRPM | SYSTOLIC BLOOD PRESSURE: 127 MMHG

## 2024-08-20 DIAGNOSIS — I10 PRIMARY HYPERTENSION: Primary | ICD-10-CM

## 2024-08-20 DIAGNOSIS — F41.9 ANXIETY: ICD-10-CM

## 2024-08-20 DIAGNOSIS — Z00.00 MEDICARE ANNUAL WELLNESS VISIT, SUBSEQUENT: ICD-10-CM

## 2024-08-20 PROCEDURE — 1123F ACP DISCUSS/DSCN MKR DOCD: CPT | Performed by: STUDENT IN AN ORGANIZED HEALTH CARE EDUCATION/TRAINING PROGRAM

## 2024-08-20 PROCEDURE — G0439 PPPS, SUBSEQ VISIT: HCPCS | Performed by: STUDENT IN AN ORGANIZED HEALTH CARE EDUCATION/TRAINING PROGRAM

## 2024-08-20 PROCEDURE — 99214 OFFICE O/P EST MOD 30 MIN: CPT | Performed by: STUDENT IN AN ORGANIZED HEALTH CARE EDUCATION/TRAINING PROGRAM

## 2024-08-20 ASSESSMENT — LIFESTYLE VARIABLES
HOW OFTEN DO YOU HAVE A DRINK CONTAINING ALCOHOL: MONTHLY OR LESS
HOW MANY STANDARD DRINKS CONTAINING ALCOHOL DO YOU HAVE ON A TYPICAL DAY: 1 OR 2

## 2024-08-20 ASSESSMENT — PATIENT HEALTH QUESTIONNAIRE - PHQ9
SUM OF ALL RESPONSES TO PHQ QUESTIONS 1-9: 0
SUM OF ALL RESPONSES TO PHQ9 QUESTIONS 1 & 2: 0
SUM OF ALL RESPONSES TO PHQ QUESTIONS 1-9: 0
1. LITTLE INTEREST OR PLEASURE IN DOING THINGS: NOT AT ALL
2. FEELING DOWN, DEPRESSED OR HOPELESS: NOT AT ALL

## 2024-08-20 NOTE — PATIENT INSTRUCTIONS
and assessments performed today your provider may have ordered immunizations, labs, imaging, and/or referrals for you.  A list of these orders (if applicable) as well as your Preventive Care list are included within your After Visit Summary for your review.    Other Preventive Recommendations:    A preventive eye exam performed by an eye specialist is recommended every 1-2 years to screen for glaucoma; cataracts, macular degeneration, and other eye disorders.  A preventive dental visit is recommended every 6 months.  Try to get at least 150 minutes of exercise per week or 10,000 steps per day on a pedometer .  Order or download the FREE \"Exercise & Physical Activity: Your Everyday Guide\" from [employer]. Call 1-958.804.2709 or search [employer] online.  You need 5327-2007 mg of calcium and 5510-2699 IU of vitamin D per day. It is possible to meet your calcium requirement with diet alone, but a vitamin D supplement is usually necessary to meet this goal.  When exposed to the sun, use a sunscreen that protects against both UVA and UVB radiation with an SPF of 30 or greater. Reapply every 2 to 3 hours or after sweating, drying off with a towel, or swimming.  Always wear a seat belt when traveling in a car. Always wear a helmet when riding a bicycle or motorcycle.

## 2024-08-21 ENCOUNTER — CLINICAL DOCUMENTATION (OUTPATIENT)
Dept: SPIRITUAL SERVICES | Age: 89
End: 2024-08-21

## 2024-08-21 NOTE — ACP (ADVANCE CARE PLANNING)
Advance Care Planning   Ambulatory ACP Specialist Patient Outreach    Date:  8/21/2024    ACP Specialist:  Debra Kumar    Outreach call to patient in follow-up to ACP Specialist referral from: Ayaz Núñez MD    [x] PCP  [] Provider   [] Ambulatory Care Management [] Other     For:                  [x] Advance Directive Assistance              [] Complete Portable DNR order              [] Complete POST/POLST/MOST              [] Code Status Discussion             [] Discuss Goals of Care             [] Early ACP Decision-Making              [] Other (Specify)    Date Referral Received: 8/20/2024    Next Step:   [x] ACP scheduled conversation  [] Outreach again in one week               [x] Email / Mail ACP Info Sheets  [x] Email / Mail Advance Directive   [] Closing referral.  Routing closure to referring provider/staff and to ACP Specialist .    [] Closure letter mailed to patient with invitation to contact ACP Specialist if / when ready.   [] Other (Specify here):       [x] At this time, Healthcare Decision Maker Is:         Primary Decision Maker: Dana Velez - [facility] - [phone]      [] Primary agent named in scanned advance directive.    [x] Legal Next of Kin.     [] Unable to determine legal decision maker at this time.    Outreaches:       [x] 1st -  Date:  8/21/2024               Intervention:  [x] Spoke with Patient   [] Left Voice mail [] Email / Mail    [] CR2hart  [] Other (Specify) :     Outcomes:  Writer attempted ACP outreach to patient's home number ([phone]) - spoke to patient.  Patient is agreeable to a conversation with ACP Specialist Lanette Roa on Tuesday, 9/3/2024 at 9:30 AM.  A copy of [facility] and [facility] information sheets mailed to patient's confirmed home address on file with ACP Specialist and Coordinator's contact information included.           Thank you for this referral.

## 2024-09-03 ENCOUNTER — CLINICAL DOCUMENTATION (OUTPATIENT)
Dept: CASE MANAGEMENT | Age: 89
End: 2024-09-03

## 2024-09-03 NOTE — ACP (ADVANCE CARE PLANNING)
benefit most from an in-person ACP appointment with another specialist.  Pt agreeable to having another appointment scheduled with the ACP Specialist that can provide an in-person appointment.  ACP Coordinator will call patient again to set up this appointment with patient.    Thank you for this referral.    Charo Roa, ROBW, LUKE-CP  Advance Care   [facility]  [phone]

## 2024-09-04 ENCOUNTER — CLINICAL DOCUMENTATION (OUTPATIENT)
Dept: SPIRITUAL SERVICES | Age: 89
End: 2024-09-04

## 2024-09-04 NOTE — ACP (ADVANCE CARE PLANNING)
Advance Care Planning   Ambulatory ACP Specialist Patient Outreach    Date:  9/4/2024    ACP Specialist:  NATHEN SHEN [facility]    Outreach call to patient in follow-up to ACP Specialist referral from:Ayaz Núñez MD    [x] PCP  [] Provider   [] Ambulatory Care Management [] Other     For:                  [x] Advance Directive Assistance              [] Complete Portable DNR order              [] Complete POST/POLST/MOST              [] Code Status Discussion             [] Discuss Goals of Care             [] Early ACP Decision-Making              [] Other (Specify)    Date Referral Received:8/20/2024    Next Step:   [x] ACP scheduled conversation  [] Outreach again in one week               [] Email / Mail ACP Info Sheets  [] Email / Mail Advance Directive   [] Closing referral.  Routing closure to referring provider/staff and to ACP Specialist .    [] Closure letter mailed to patient with invitation to contact ACP Specialist if / when ready.   [] Other (Specify here):       [] At this time, Healthcare Decision Maker Is:         [] Primary agent named in scanned advance directive.    [x] Legal Next of Kin.   Primary Decision Maker: Dana Velez - Child - [phone]      [] Unable to determine legal decision maker at this time.    Outreaches:           [x]  Additional Outreach -  Date:  9/4/2024   (Specify Dates & special circumstances):    Outcomes: ACP Specialist contacted pt this am via phone ([phone]) offering an appt at her appt for convenience due to transportation, etc. Pt did not want to have ACP conversation at her dwelling.  Arranged to meet her post PCP appt scheduled on 10/3/2024.  Will make arrangements at the office for space to meet.  She has Specialist contact information if she choose to cancel or request another arrangement.  She plans to talk with her daughter.  Offered to receive a call from daughter if further explanation is requested.  NATHEN SHEN,

## 2024-09-26 ENCOUNTER — CLINICAL DOCUMENTATION (OUTPATIENT)
Dept: SPIRITUAL SERVICES | Age: 89
End: 2024-09-26

## 2024-10-03 ENCOUNTER — OFFICE VISIT (OUTPATIENT)
Age: 89
End: 2024-10-03
Payer: MEDICARE

## 2024-10-03 VITALS
BODY MASS INDEX: 24.56 KG/M2 | DIASTOLIC BLOOD PRESSURE: 78 MMHG | HEART RATE: 94 BPM | TEMPERATURE: 98.2 F | SYSTOLIC BLOOD PRESSURE: 150 MMHG | RESPIRATION RATE: 16 BRPM | WEIGHT: 121.8 LBS | HEIGHT: 59 IN | OXYGEN SATURATION: 95 %

## 2024-10-03 DIAGNOSIS — E03.9 ACQUIRED HYPOTHYROIDISM: Primary | ICD-10-CM

## 2024-10-03 DIAGNOSIS — I10 PRIMARY HYPERTENSION: ICD-10-CM

## 2024-10-03 DIAGNOSIS — J01.01 ACUTE RECURRENT MAXILLARY SINUSITIS: ICD-10-CM

## 2024-10-03 DIAGNOSIS — J30.89 NON-SEASONAL ALLERGIC RHINITIS, UNSPECIFIED TRIGGER: ICD-10-CM

## 2024-10-03 LAB — TSH SERPL DL<=0.05 MIU/L-ACNC: 6.94 UIU/ML (ref 0.36–3.74)

## 2024-10-03 PROCEDURE — 99214 OFFICE O/P EST MOD 30 MIN: CPT | Performed by: STUDENT IN AN ORGANIZED HEALTH CARE EDUCATION/TRAINING PROGRAM

## 2024-10-03 RX ORDER — FLUTICASONE PROPIONATE 50 MCG
2 SPRAY, SUSPENSION (ML) NASAL DAILY
Qty: 16 G | Refills: 0 | Status: SHIPPED | OUTPATIENT
Start: 2024-10-03

## 2024-10-03 RX ORDER — AMOXICILLIN 875 MG
875 TABLET ORAL 2 TIMES DAILY
Qty: 14 TABLET | Refills: 0 | Status: SHIPPED | OUTPATIENT
Start: 2024-10-03 | End: 2024-10-10

## 2024-10-03 NOTE — PROGRESS NOTES
Chief Complaint   Patient presents with    Hemoptysis     For 2 weeks now.   Hx of recurrent hemoptysis throughout her life. Takes antibiotics whenever it recurs.     \"Have you been to the ER, urgent care clinic since your last visit?  Hospitalized since your last visit?\"    NO    “Have you seen or consulted any other health care providers outside of [facility] since your last visit?”    NO            Click Here for Release of Records Request             8/20/2024     3:31 PM   PHQ-9    Little interest or pleasure in doing things 0   Feeling down, depressed, or hopeless 0   PHQ-2 Score 0   PHQ-9 Total Score 0           Financial Resource Strain: Low Risk  (6/27/2024)    Overall Financial Resource Strain (CARDIA)     Difficulty of Paying Living Expenses: Not hard at all      Food Insecurity: No Food Insecurity (6/27/2024)    Hunger Vital Sign     Worried About Running Out of Food in the Last Year: Never true     Ran Out of Food in the Last Year: Never true          Health Maintenance Due   Topic Date Due    DTaP/Tdap/Td vaccine (1 - Tdap) Never done    Shingles vaccine (1 of 2) Never done    Respiratory Syncytial Virus (RSV) Pregnant or age 60 yrs+ (1 - 1-dose 60+ series) Never done    Pneumococcal 65+ years Vaccine (1 of 1 - PCV) Never done    Flu vaccine (1) 08/01/2024    COVID-19 Vaccine (7 - 2023-24 season) 09/01/2024

## 2024-10-03 NOTE — PROGRESS NOTES
Assessment/Plan:     1. Acquired hypothyroidism-chronic, unstable with the last TSH at 8.22 on 6/27/2024.  Will recheck TSH today and adjust Synthroid as indicated.  -     TSH; Future  2. Primary hypertension-chronic, unclear control.  BP above goal in clinic today but patient reports she has not taking her blood pressure pills yet today and that blood pressure at home is consistently less than 140/90.  Will advise patient to ensure she is taking her medication appropriately and to check blood pressure daily and bring log to next visit.  Will follow-up in 1 week.  3. Acute recurrent maxillary sinusitis-acute, unstable.  Will treat with 7-day course of amoxicillin and follow-up in 1 week.  -     amoxicillin (AMOXIL) 875 MG tablet; Take 1 tablet by mouth 2 times daily for 7 days, Disp-14 tablet, R-0Normal  4. Non-seasonal allergic rhinitis, unspecified trigger-chronic, unstable.  Will treat with Flonase daily to prevent future sinus infections as well as chronic rhinitis.  -     fluticasone (FLONASE) 50 MCG/ACT nasal spray; 2 sprays by Each Nostril route daily, Disp-16 g, R-0Normal       Follow up:     Return in about 1 week (around 10/10/2024) for Chronic conditions follow up.      I have reviewed patient medical and social history and medications.  I have reviewed pertinent labs results and other data. I have discussed the diagnosis with the patient and the intended plan as seen in the above orders. The patient has received an after-visit summary and questions were answered concerning future plans. I have discussed medication side effects and warnings with the patient as well.        Subjective:     Chief Complaint   Patient presents with    Hemoptysis     For 2 weeks now.   Hx of recurrent hemoptysis throughout her life. Takes antibiotics whenever it recurs.       HPI:  Danni Harrington is a 89 y.o. female that presents for: Hypothyroidism follow-up but also has some other concerns to discuss.  She reports 2-week

## 2024-10-10 ENCOUNTER — CLINICAL DOCUMENTATION (OUTPATIENT)
Dept: SPIRITUAL SERVICES | Age: 89
End: 2024-10-10

## 2024-10-15 ENCOUNTER — OFFICE VISIT (OUTPATIENT)
Age: 89
End: 2024-10-15
Payer: MEDICARE

## 2024-10-15 VITALS
BODY MASS INDEX: 24.03 KG/M2 | DIASTOLIC BLOOD PRESSURE: 71 MMHG | HEART RATE: 94 BPM | OXYGEN SATURATION: 92 % | SYSTOLIC BLOOD PRESSURE: 160 MMHG | RESPIRATION RATE: 16 BRPM | HEIGHT: 59 IN | WEIGHT: 119.2 LBS | TEMPERATURE: 97.7 F

## 2024-10-15 DIAGNOSIS — E03.9 ACQUIRED HYPOTHYROIDISM: ICD-10-CM

## 2024-10-15 DIAGNOSIS — I10 PRIMARY HYPERTENSION: Primary | ICD-10-CM

## 2024-10-15 DIAGNOSIS — Z23 ENCOUNTER FOR IMMUNIZATION: ICD-10-CM

## 2024-10-15 PROCEDURE — 90653 IIV ADJUVANT VACCINE IM: CPT | Performed by: STUDENT IN AN ORGANIZED HEALTH CARE EDUCATION/TRAINING PROGRAM

## 2024-10-15 PROCEDURE — 99214 OFFICE O/P EST MOD 30 MIN: CPT | Performed by: STUDENT IN AN ORGANIZED HEALTH CARE EDUCATION/TRAINING PROGRAM

## 2024-10-15 PROCEDURE — 1123F ACP DISCUSS/DSCN MKR DOCD: CPT | Performed by: STUDENT IN AN ORGANIZED HEALTH CARE EDUCATION/TRAINING PROGRAM

## 2024-10-15 PROCEDURE — PBSHW INFLUENZA, FLUAD TRIVALENT, (AGE 65 Y+), IM, PRESERVATIVE FREE, 0.5ML: Performed by: STUDENT IN AN ORGANIZED HEALTH CARE EDUCATION/TRAINING PROGRAM

## 2024-10-15 RX ORDER — AMLODIPINE BESYLATE 10 MG/1
10 TABLET ORAL DAILY
Qty: 90 TABLET | Refills: 3 | Status: SHIPPED | OUTPATIENT
Start: 2024-10-15

## 2024-10-15 ASSESSMENT — PATIENT HEALTH QUESTIONNAIRE - PHQ9
2. FEELING DOWN, DEPRESSED OR HOPELESS: NOT AT ALL
SUM OF ALL RESPONSES TO PHQ QUESTIONS 1-9: 0
SUM OF ALL RESPONSES TO PHQ9 QUESTIONS 1 & 2: 0
1. LITTLE INTEREST OR PLEASURE IN DOING THINGS: NOT AT ALL
SUM OF ALL RESPONSES TO PHQ QUESTIONS 1-9: 0

## 2024-10-15 NOTE — PROGRESS NOTES
Chief Complaint   Patient presents with    Hypertension     One week follow up    Hypothyroidism     One week follow up     \"Have you been to the ER, urgent care clinic since your last visit?  Hospitalized since your last visit?\"    NO    “Have you seen or consulted any other health care providers outside of [facility] since your last visit?”    NO            Click Here for Release of Records Request             8/20/2024     3:31 PM   PHQ-9    Little interest or pleasure in doing things 0   Feeling down, depressed, or hopeless 0   PHQ-2 Score 0   PHQ-9 Total Score 0           Financial Resource Strain: Low Risk  (6/27/2024)    Overall Financial Resource Strain (CARDIA)     Difficulty of Paying Living Expenses: Not hard at all      Food Insecurity: No Food Insecurity (6/27/2024)    Hunger Vital Sign     Worried About Running Out of Food in the Last Year: Never true     Ran Out of Food in the Last Year: Never true          Health Maintenance Due   Topic Date Due    DTaP/Tdap/Td vaccine (1 - Tdap) Never done    Shingles vaccine (1 of 2) Never done    Respiratory Syncytial Virus (RSV) Pregnant or age 60 yrs+ (1 - 1-dose 60+ series) Never done    Pneumococcal 65+ years Vaccine (2 of 2 - PPSV23 or PCV20) 08/30/2021    Flu vaccine (1) 08/01/2024    COVID-19 Vaccine (7 - 2023-24 season) 09/01/2024

## 2024-10-15 NOTE — PROGRESS NOTES
Assessment/Plan:     1. Primary hypertension-chronic, stable.  Although BP above goal in clinic, home log shows BP consistently at goal.  Will continue current regimen advised patient to continue monitoring BP at home and bring log as well as blood pressure cuff to clinic with her to ensure its accuracy.  -     amLODIPine (NORVASC) 10 MG tablet; Take 1 tablet by mouth daily, Disp-90 tablet, R-3Normal  2. Acquired hypothyroidism -chronic, stable.  Although TSH is elevated, it is in normal range once adjusted for age.  Continue current treatment plan.  3. Encounter for immunization  -     Influenza, FLUAD Trivalent, (age 65 y+), IM, Preservative Free, 0.5mL       Follow up:     Return in about 3 months (around 1/15/2025) for Chronic conditions follow up.      I have reviewed patient medical and social history and medications.  I have reviewed pertinent labs results and other data. I have discussed the diagnosis with the patient and the intended plan as seen in the above orders. The patient has received an after-visit summary and questions were answered concerning future plans. I have discussed medication side effects and warnings with the patient as well.        Subjective:     Chief Complaint   Patient presents with    Hypertension     One week follow up    Hypothyroidism     One week follow up       HPI:  Danni Harrington is a 89 y.o. female that presents for: Hypertension hypothyroidism follow-up.  Patient's blood pressure in clinic appears to be above goal but patient has been checking BP at home and has her log with her.  BP at home appears to be 120s to 130s over 70s at home consistently.  She has her blood pressure medications with her and wants to make sure they are all correct.  Her lab results including her TSH reviewed with her, advising to continue on the current dose of Synthroid at 88 mcg daily.  She denies any chest pain, shortness of breath or any other complaints at this time.            Health

## 2024-12-27 ENCOUNTER — TELEPHONE (OUTPATIENT)
Age: 88
End: 2024-12-27

## 2024-12-27 NOTE — TELEPHONE ENCOUNTER
Patient called stated that she has hives do not know where is coming from. Would like for the nurse to give a call back.    Requesting a call back    Best call back #[phone]

## 2025-01-14 ENCOUNTER — OFFICE VISIT (OUTPATIENT)
Age: 89
End: 2025-01-14
Payer: MEDICARE

## 2025-01-14 VITALS
DIASTOLIC BLOOD PRESSURE: 89 MMHG | RESPIRATION RATE: 16 BRPM | OXYGEN SATURATION: 97 % | SYSTOLIC BLOOD PRESSURE: 163 MMHG | TEMPERATURE: 97.8 F | WEIGHT: 121.6 LBS | HEART RATE: 66 BPM | BODY MASS INDEX: 24.52 KG/M2 | HEIGHT: 59 IN

## 2025-01-14 DIAGNOSIS — M81.0 AGE-RELATED OSTEOPOROSIS WITHOUT CURRENT PATHOLOGICAL FRACTURE: ICD-10-CM

## 2025-01-14 DIAGNOSIS — I10 PRIMARY HYPERTENSION: Primary | ICD-10-CM

## 2025-01-14 DIAGNOSIS — E03.9 ACQUIRED HYPOTHYROIDISM: ICD-10-CM

## 2025-01-14 DIAGNOSIS — F41.9 ANXIETY: ICD-10-CM

## 2025-01-14 PROCEDURE — 1126F AMNT PAIN NOTED NONE PRSNT: CPT | Performed by: STUDENT IN AN ORGANIZED HEALTH CARE EDUCATION/TRAINING PROGRAM

## 2025-01-14 PROCEDURE — G8420 CALC BMI NORM PARAMETERS: HCPCS | Performed by: STUDENT IN AN ORGANIZED HEALTH CARE EDUCATION/TRAINING PROGRAM

## 2025-01-14 PROCEDURE — G2211 COMPLEX E/M VISIT ADD ON: HCPCS | Performed by: STUDENT IN AN ORGANIZED HEALTH CARE EDUCATION/TRAINING PROGRAM

## 2025-01-14 PROCEDURE — 99214 OFFICE O/P EST MOD 30 MIN: CPT | Performed by: STUDENT IN AN ORGANIZED HEALTH CARE EDUCATION/TRAINING PROGRAM

## 2025-01-14 PROCEDURE — G8427 DOCREV CUR MEDS BY ELIG CLIN: HCPCS | Performed by: STUDENT IN AN ORGANIZED HEALTH CARE EDUCATION/TRAINING PROGRAM

## 2025-01-14 PROCEDURE — 1090F PRES/ABSN URINE INCON ASSESS: CPT | Performed by: STUDENT IN AN ORGANIZED HEALTH CARE EDUCATION/TRAINING PROGRAM

## 2025-01-14 PROCEDURE — 1123F ACP DISCUSS/DSCN MKR DOCD: CPT | Performed by: STUDENT IN AN ORGANIZED HEALTH CARE EDUCATION/TRAINING PROGRAM

## 2025-01-14 PROCEDURE — 1159F MED LIST DOCD IN RCRD: CPT | Performed by: STUDENT IN AN ORGANIZED HEALTH CARE EDUCATION/TRAINING PROGRAM

## 2025-01-14 PROCEDURE — 1036F TOBACCO NON-USER: CPT | Performed by: STUDENT IN AN ORGANIZED HEALTH CARE EDUCATION/TRAINING PROGRAM

## 2025-01-14 RX ORDER — HYDROXYZINE HYDROCHLORIDE 25 MG/1
25 TABLET, FILM COATED ORAL EVERY 8 HOURS PRN
Qty: 30 TABLET | Refills: 0 | Status: SHIPPED | OUTPATIENT
Start: 2025-01-14 | End: 2025-01-24

## 2025-01-14 SDOH — ECONOMIC STABILITY: FOOD INSECURITY: WITHIN THE PAST 12 MONTHS, YOU WORRIED THAT YOUR FOOD WOULD RUN OUT BEFORE YOU GOT MONEY TO BUY MORE.: NEVER TRUE

## 2025-01-14 SDOH — ECONOMIC STABILITY: FOOD INSECURITY: WITHIN THE PAST 12 MONTHS, THE FOOD YOU BOUGHT JUST DIDN'T LAST AND YOU DIDN'T HAVE MONEY TO GET MORE.: NEVER TRUE

## 2025-01-14 ASSESSMENT — PATIENT HEALTH QUESTIONNAIRE - PHQ9
SUM OF ALL RESPONSES TO PHQ QUESTIONS 1-9: 0
SUM OF ALL RESPONSES TO PHQ QUESTIONS 1-9: 0
1. LITTLE INTEREST OR PLEASURE IN DOING THINGS: NOT AT ALL
SUM OF ALL RESPONSES TO PHQ QUESTIONS 1-9: 0
SUM OF ALL RESPONSES TO PHQ9 QUESTIONS 1 & 2: 0
2. FEELING DOWN, DEPRESSED OR HOPELESS: NOT AT ALL
SUM OF ALL RESPONSES TO PHQ QUESTIONS 1-9: 0

## 2025-01-14 NOTE — PROGRESS NOTES
Assessment/Plan:     1. Primary hypertension-chronic, unstable.  Home BP log shows BP at goal but after observing patient check her blood pressure in clinic, it appears she may be doing it incorrectly at home.  Discussed changing medications today but she would like to hold off and check blood pressure at home correctly and bring the log with her next time.  -     Comprehensive Metabolic Panel; Future  2. Acquired hypothyroidism-chronic, unstable.  Patient stopped taking Synthroid approximately 4 weeks ago so we will check TSH and advised patient to restart Synthroid 88 mcg daily.  -     TSH; Future  3. Age-related osteoporosis without current pathological fracture -chronic, stable.  Continue current treatment plan.   -     DEXA BONE DENSITY AXIAL SKELETON; Future  4. Anxiety-chronic, unstable.  Discussed SSRIs but patient does not wish to be on anything continuously but request something to use as needed during anxiety attacks so we will try Atarax as needed.  -     hydrOXYzine HCl (ATARAX) 25 MG tablet; Take 1 tablet by mouth every 8 hours as needed for Itching, Disp-30 tablet, R-0Normal       Follow up:     Return in about 1 week (around 1/21/2025) for HTN f/u.      I have reviewed patient medical and social history and medications.  I have reviewed pertinent labs results and other data. I have discussed the diagnosis with the patient and the intended plan as seen in the above orders. The patient has received an after-visit summary and questions were answered concerning future plans. I have discussed medication side effects and warnings with the patient as well.        Subjective:     Chief Complaint   Patient presents with    3 Month Follow-Up     Broke up with hives during the [place] season, and was given some cream/ointment at her local pharmacy.    Lots of nasal discharge for about a month. No other symptoms.    Medication Check     Said she quit taking the thyroid medication since December.

## 2025-01-14 NOTE — PROGRESS NOTES
Chief Complaint   Patient presents with    3 Month Follow-Up     Broke up with hives during the Christmas season, and was given some cream/ointment at her local pharmacy.    Lots of nasal discharge for about a month. No other symptoms.    Medication Check     Said she quit taking the thyroid medication since December.     \"Have you been to the ER, urgent care clinic since your last visit?  Hospitalized since your last visit?\"    NO    “Have you seen or consulted any other health care providers outside of [facility] since your last visit?”    NO            Click Here for Release of Records Request             1/14/2025     1:35 PM   PHQ-9    Little interest or pleasure in doing things 0   Feeling down, depressed, or hopeless 0   PHQ-2 Score 0   PHQ-9 Total Score 0           Financial Resource Strain: Low Risk  (6/27/2024)    Overall Financial Resource Strain (CARDIA)     Difficulty of Paying Living Expenses: Not hard at all      Food Insecurity: No Food Insecurity (1/14/2025)    Hunger Vital Sign     Worried About Running Out of Food in the Last Year: Never true     Ran Out of Food in the Last Year: Never true          Health Maintenance Due   Topic Date Due    DTaP/Tdap/Td vaccine (1 - Tdap) Never done    Shingles vaccine (1 of 2) Never done    Respiratory Syncytial Virus (RSV) Pregnant or age 60 yrs+ (1 - 1-dose 75+ series) Never done    Pneumococcal 65+ years Vaccine (2 of 2 - PPSV23 or PCV20) 08/30/2021

## 2025-01-14 NOTE — PATIENT INSTRUCTIONS
-Keep taking blood pressure medications (amlodipine and metoprolol) as well as Calcium supplements  -Restart Synthroid 88mcg daily  -Restart Flonase two puffs daily  -Start using Allergra or claritin once daily  -Use Hydroxyzine 25mg As needed for anxiety (every 8 hours)  -Use Benadryl Only as needed for hives    -Check your blood pressure above elbow

## 2025-01-15 LAB
ALBUMIN SERPL-MCNC: 3.7 G/DL (ref 3.5–5)
ALBUMIN/GLOB SERPL: 0.9 (ref 1.1–2.2)
ALP SERPL-CCNC: 81 U/L (ref 45–117)
ALT SERPL-CCNC: 14 U/L (ref 12–78)
ANION GAP SERPL CALC-SCNC: 8 MMOL/L (ref 2–12)
AST SERPL-CCNC: 16 U/L (ref 15–37)
BILIRUB SERPL-MCNC: 0.5 MG/DL (ref 0.2–1)
BUN SERPL-MCNC: 20 MG/DL (ref 6–20)
BUN/CREAT SERPL: 15 (ref 12–20)
CALCIUM SERPL-MCNC: 9.4 MG/DL (ref 8.5–10.1)
CHLORIDE SERPL-SCNC: 104 MMOL/L (ref 97–108)
CO2 SERPL-SCNC: 24 MMOL/L (ref 21–32)
CREAT SERPL-MCNC: 1.3 MG/DL (ref 0.55–1.02)
GLOBULIN SER CALC-MCNC: 4.3 G/DL (ref 2–4)
GLUCOSE SERPL-MCNC: 92 MG/DL (ref 65–100)
POTASSIUM SERPL-SCNC: 4.2 MMOL/L (ref 3.5–5.1)
PROT SERPL-MCNC: 8 G/DL (ref 6.4–8.2)
SODIUM SERPL-SCNC: 136 MMOL/L (ref 136–145)
TSH SERPL DL<=0.05 MIU/L-ACNC: 90.1 UIU/ML (ref 0.36–3.74)

## 2025-01-16 NOTE — RESULT ENCOUNTER NOTE
TSH elevated to 90, patient had stopped taking her Synthroid, which we resumed, will recheck TSH in 6 weeks  CMP remarkable for elevated creatinine to 1.3, will recheck kidney function next week to ensure resolution

## 2025-01-21 ENCOUNTER — OFFICE VISIT (OUTPATIENT)
Age: 89
End: 2025-01-21
Payer: MEDICARE

## 2025-01-21 VITALS
BODY MASS INDEX: 24.39 KG/M2 | HEIGHT: 59 IN | RESPIRATION RATE: 16 BRPM | TEMPERATURE: 96.8 F | HEART RATE: 73 BPM | DIASTOLIC BLOOD PRESSURE: 64 MMHG | SYSTOLIC BLOOD PRESSURE: 162 MMHG | WEIGHT: 121 LBS | OXYGEN SATURATION: 97 %

## 2025-01-21 DIAGNOSIS — R79.89 ELEVATED SERUM CREATININE: ICD-10-CM

## 2025-01-21 DIAGNOSIS — E03.9 ACQUIRED HYPOTHYROIDISM: ICD-10-CM

## 2025-01-21 DIAGNOSIS — I10 PRIMARY HYPERTENSION: Primary | ICD-10-CM

## 2025-01-21 LAB
ANION GAP SERPL CALC-SCNC: 9 MMOL/L (ref 2–12)
BUN SERPL-MCNC: 26 MG/DL (ref 6–20)
BUN/CREAT SERPL: 20 (ref 12–20)
CALCIUM SERPL-MCNC: 9.9 MG/DL (ref 8.5–10.1)
CHLORIDE SERPL-SCNC: 103 MMOL/L (ref 97–108)
CO2 SERPL-SCNC: 25 MMOL/L (ref 21–32)
CREAT SERPL-MCNC: 1.29 MG/DL (ref 0.55–1.02)
GLUCOSE SERPL-MCNC: 100 MG/DL (ref 65–100)
POTASSIUM SERPL-SCNC: 4.4 MMOL/L (ref 3.5–5.1)
SODIUM SERPL-SCNC: 137 MMOL/L (ref 136–145)

## 2025-01-21 PROCEDURE — 99214 OFFICE O/P EST MOD 30 MIN: CPT | Performed by: STUDENT IN AN ORGANIZED HEALTH CARE EDUCATION/TRAINING PROGRAM

## 2025-01-21 ASSESSMENT — PATIENT HEALTH QUESTIONNAIRE - PHQ9
SUM OF ALL RESPONSES TO PHQ QUESTIONS 1-9: 0
2. FEELING DOWN, DEPRESSED OR HOPELESS: NOT AT ALL
SUM OF ALL RESPONSES TO PHQ9 QUESTIONS 1 & 2: 0
SUM OF ALL RESPONSES TO PHQ QUESTIONS 1-9: 0
SUM OF ALL RESPONSES TO PHQ QUESTIONS 1-9: 0
1. LITTLE INTEREST OR PLEASURE IN DOING THINGS: NOT AT ALL
SUM OF ALL RESPONSES TO PHQ QUESTIONS 1-9: 0

## 2025-01-21 NOTE — PROGRESS NOTES
Assessment/Plan:     1. Primary hypertension-chronic, BP above goal in clinic but home blood pressure log shows BP at goal most of the time.  Considering patient's advanced age and fall risk, will be conservative to avoid hypotensive episodes and continue current medication regimen and plan.  2. Acquired hypothyroidism-chronic, unstable.  Patient had stopped this medication due to concerns for an allergic reaction and although she was advised to resume this during her last visit, she was confused so counseled patient again extensively and documented the plan in patient's AVS for patient to refer her back to.  Will follow-up in 6 weeks to recheck TSH.  3. Elevated serum creatinine-acute, noted on routine blood work during our last visit so we will recheck BMP today to ensure resolution.  -     Basic Metabolic Panel; Future       Follow up:     Return in about 6 weeks (around 3/4/2025) for hypothyroidism f/u.      I have reviewed patient medical and social history and medications.  I have reviewed pertinent labs results and other data. I have discussed the diagnosis with the patient and the intended plan as seen in the above orders. The patient has received an after-visit summary and questions were answered concerning future plans. I have discussed medication side effects and warnings with the patient as well.        Subjective:     Chief Complaint   Patient presents with    Follow-up     One week follow up for HTN       HPI:  Danni Harrington is a 89 y.o. female that presents for: Hypertension follow-up.  Patient has been checking blood pressure at home and to correct technique was reviewed with patient during last visit.  Home blood pressure log shows blood pressure in 130s over 70s, with the lowest reading at 110s over 60s.  After reviewing med list today, it appears patient was confused and did not restart her Synthroid since her last visit.  Lab results as well as the plan to resume all medications including Synthroid

## 2025-01-21 NOTE — PROGRESS NOTES
Chief Complaint   Patient presents with    Follow-up     One week follow up for HTN     \"Have you been to the ER, urgent care clinic since your last visit?  Hospitalized since your last visit?\"    NO    “Have you seen or consulted any other health care providers outside of [facility] since your last visit?”    NO            Click Here for Release of Records Request             1/14/2025     1:35 PM   PHQ-9    Little interest or pleasure in doing things 0   Feeling down, depressed, or hopeless 0   PHQ-2 Score 0   PHQ-9 Total Score 0           Financial Resource Strain: Low Risk  (6/27/2024)    Overall Financial Resource Strain (CARDIA)     Difficulty of Paying Living Expenses: Not hard at all      Food Insecurity: No Food Insecurity (1/14/2025)    Hunger Vital Sign     Worried About Running Out of Food in the Last Year: Never true     Ran Out of Food in the Last Year: Never true          Health Maintenance Due   Topic Date Due    DTaP/Tdap/Td vaccine (1 - Tdap) Never done    Shingles vaccine (1 of 2) Never done    Respiratory Syncytial Virus (RSV) Pregnant or age 60 yrs+ (1 - 1-dose 75+ series) Never done    Pneumococcal 65+ years Vaccine (2 of 2 - PPSV23 or PCV20) 08/30/2021

## 2025-01-22 NOTE — RESULT ENCOUNTER NOTE
Creatinine stable compared to last week, although above previous baseline.  Will continue to monitor

## 2025-03-04 ENCOUNTER — OFFICE VISIT (OUTPATIENT)
Age: 89
End: 2025-03-04
Payer: MEDICARE

## 2025-03-04 VITALS
WEIGHT: 124 LBS | HEIGHT: 59 IN | BODY MASS INDEX: 25 KG/M2 | HEART RATE: 77 BPM | TEMPERATURE: 97.7 F | DIASTOLIC BLOOD PRESSURE: 60 MMHG | RESPIRATION RATE: 18 BRPM | OXYGEN SATURATION: 99 % | SYSTOLIC BLOOD PRESSURE: 140 MMHG

## 2025-03-04 DIAGNOSIS — I10 PRIMARY HYPERTENSION: Primary | ICD-10-CM

## 2025-03-04 DIAGNOSIS — H91.93 BILATERAL HEARING LOSS, UNSPECIFIED HEARING LOSS TYPE: ICD-10-CM

## 2025-03-04 DIAGNOSIS — E03.9 ACQUIRED HYPOTHYROIDISM: ICD-10-CM

## 2025-03-04 PROCEDURE — G8419 CALC BMI OUT NRM PARAM NOF/U: HCPCS | Performed by: STUDENT IN AN ORGANIZED HEALTH CARE EDUCATION/TRAINING PROGRAM

## 2025-03-04 PROCEDURE — 1090F PRES/ABSN URINE INCON ASSESS: CPT | Performed by: STUDENT IN AN ORGANIZED HEALTH CARE EDUCATION/TRAINING PROGRAM

## 2025-03-04 PROCEDURE — G8427 DOCREV CUR MEDS BY ELIG CLIN: HCPCS | Performed by: STUDENT IN AN ORGANIZED HEALTH CARE EDUCATION/TRAINING PROGRAM

## 2025-03-04 PROCEDURE — 1126F AMNT PAIN NOTED NONE PRSNT: CPT | Performed by: STUDENT IN AN ORGANIZED HEALTH CARE EDUCATION/TRAINING PROGRAM

## 2025-03-04 PROCEDURE — 1159F MED LIST DOCD IN RCRD: CPT | Performed by: STUDENT IN AN ORGANIZED HEALTH CARE EDUCATION/TRAINING PROGRAM

## 2025-03-04 PROCEDURE — 99214 OFFICE O/P EST MOD 30 MIN: CPT | Performed by: STUDENT IN AN ORGANIZED HEALTH CARE EDUCATION/TRAINING PROGRAM

## 2025-03-04 PROCEDURE — 1036F TOBACCO NON-USER: CPT | Performed by: STUDENT IN AN ORGANIZED HEALTH CARE EDUCATION/TRAINING PROGRAM

## 2025-03-04 PROCEDURE — G2211 COMPLEX E/M VISIT ADD ON: HCPCS | Performed by: STUDENT IN AN ORGANIZED HEALTH CARE EDUCATION/TRAINING PROGRAM

## 2025-03-04 PROCEDURE — 1123F ACP DISCUSS/DSCN MKR DOCD: CPT | Performed by: STUDENT IN AN ORGANIZED HEALTH CARE EDUCATION/TRAINING PROGRAM

## 2025-03-04 ASSESSMENT — PATIENT HEALTH QUESTIONNAIRE - PHQ9
2. FEELING DOWN, DEPRESSED OR HOPELESS: NOT AT ALL
SUM OF ALL RESPONSES TO PHQ QUESTIONS 1-9: 0
1. LITTLE INTEREST OR PLEASURE IN DOING THINGS: NOT AT ALL
SUM OF ALL RESPONSES TO PHQ QUESTIONS 1-9: 0

## 2025-03-04 NOTE — PROGRESS NOTES
Chief Complaint   Patient presents with    Thyroid Problem     \"Have you been to the ER, urgent care clinic since your last visit?  Hospitalized since your last visit?\"    NO    “Have you seen or consulted any other health care providers outside of [facility] since your last visit?”    NO                   3/4/2025     2:08 PM   PHQ-9    Little interest or pleasure in doing things 0   Feeling down, depressed, or hopeless 0   PHQ-2 Score 0   PHQ-9 Total Score 0           Financial Resource Strain: Low Risk  (6/27/2024)    Overall Financial Resource Strain (CARDIA)     Difficulty of Paying Living Expenses: Not hard at all      Food Insecurity: No Food Insecurity (1/14/2025)    Hunger Vital Sign     Worried About Running Out of Food in the Last Year: Never true     Ran Out of Food in the Last Year: Never true          Health Maintenance Due   Topic Date Due    DTaP/Tdap/Td vaccine (1 - Tdap) Never done    Shingles vaccine (1 of 2) Never done    Respiratory Syncytial Virus (RSV) Pregnant or age 60 yrs+ (1 - 1-dose 75+ series) Never done    Pneumococcal 50+ years Vaccine (2 of 2 - PPSV23) 08/30/2021

## 2025-03-05 LAB
ANION GAP SERPL CALC-SCNC: 9 MMOL/L (ref 2–12)
BUN SERPL-MCNC: 24 MG/DL (ref 6–20)
BUN/CREAT SERPL: 20 (ref 12–20)
CALCIUM SERPL-MCNC: 9.5 MG/DL (ref 8.5–10.1)
CHLORIDE SERPL-SCNC: 102 MMOL/L (ref 97–108)
CO2 SERPL-SCNC: 26 MMOL/L (ref 21–32)
CREAT SERPL-MCNC: 1.23 MG/DL (ref 0.55–1.02)
GLUCOSE SERPL-MCNC: 103 MG/DL (ref 65–100)
POTASSIUM SERPL-SCNC: 4.6 MMOL/L (ref 3.5–5.1)
SODIUM SERPL-SCNC: 137 MMOL/L (ref 136–145)
T4 FREE SERPL-MCNC: 1.3 NG/DL (ref 0.8–1.5)
TSH SERPL DL<=0.05 MIU/L-ACNC: 5.14 UIU/ML (ref 0.36–3.74)

## 2025-03-05 NOTE — ASSESSMENT & PLAN NOTE
Chronic, at goal (stable), continue current treatment plan    Orders:    TSH + Free T4 Panel; Future    TSH + Free T4 Panel

## 2025-03-05 NOTE — PROGRESS NOTES
Danni Harrington (:  1932) is a 89 y.o. female, Established patient, here for evaluation of the following chief complaint(s):  Thyroid Problem         Assessment & Plan  Primary hypertension    Chronic, blood pressure slightly above goal today in clinic but home log shows well-controlled blood pressure so we will continue current medications and follow-up in 3 months.    Orders:    Basic Metabolic Panel; Future    Basic Metabolic Panel    Bilateral hearing loss, unspecified hearing loss type   Chronic, not at goal (unstable), will refer to ENT for further evaluation and management.    Orders:    Dawood Adams MD, Otolaryngology, [place] ([address])    Acquired hypothyroidism   Chronic, at goal (stable), continue current treatment plan    Orders:    TSH + Free T4 Panel; Future    TSH + Free T4 Panel         Return in about 3 months (around 2025) for Chronic conditions follow up.       Subjective   History of Present Illness  The patient is a 89-year-old female here for follow-up for chronic conditions.    Stress  - The patient reports experiencing significant stress.  - She attributes this stress to the current political climate and ongoing issues with her insurance company, [employer] [facility].  - She has been unable to receive her bills since 2025, despite numerous attempts to contact them.  - She was informed that her payments should be sent to a post office box in [place], a directive she finds suspicious.    Hearing Aids  - The patient expresses dissatisfaction with her [employer] hearing aids.  - She is seeking recommendations for superior alternatives within a 10-mile radius.  - She mentions that she would require shuttle transportation to reach the recommended provider.    Review of Systems       Objective   Blood pressure (!) 140/60, pulse 77, temperature 97.7 °F (36.5 °C), temperature source Temporal, resp. rate 18, height 1.499 m (4' 11\"), weight 56.2 kg (124

## 2025-03-05 NOTE — ASSESSMENT & PLAN NOTE
Chronic, blood pressure slightly above goal today in clinic but home log shows well-controlled blood pressure so we will continue current medications and follow-up in 3 months.    Orders:    Basic Metabolic Panel; Future    Basic Metabolic Panel

## 2025-03-05 NOTE — ASSESSMENT & PLAN NOTE
Chronic, not at goal (unstable), will refer to ENT for further evaluation and management.    Orders:    Dawood Adams MD, [facility] ([place] [address])

## 2025-03-06 NOTE — RESULT ENCOUNTER NOTE
Creatinine stable  TSH significantly improved and although slightly elevated it is close to age-adjusted normal, will continue current regimen for now

## 2025-06-12 ENCOUNTER — OFFICE VISIT (OUTPATIENT)
Age: 89
End: 2025-06-12
Payer: MEDICARE

## 2025-06-12 VITALS
BODY MASS INDEX: 26.77 KG/M2 | WEIGHT: 132.8 LBS | DIASTOLIC BLOOD PRESSURE: 69 MMHG | RESPIRATION RATE: 18 BRPM | HEIGHT: 59 IN | HEART RATE: 68 BPM | TEMPERATURE: 96.9 F | OXYGEN SATURATION: 96 % | SYSTOLIC BLOOD PRESSURE: 134 MMHG

## 2025-06-12 DIAGNOSIS — E03.9 ACQUIRED HYPOTHYROIDISM: ICD-10-CM

## 2025-06-12 DIAGNOSIS — J30.89 NON-SEASONAL ALLERGIC RHINITIS, UNSPECIFIED TRIGGER: ICD-10-CM

## 2025-06-12 DIAGNOSIS — I10 PRIMARY HYPERTENSION: Primary | ICD-10-CM

## 2025-06-12 DIAGNOSIS — R26.81 UNSTEADY: ICD-10-CM

## 2025-06-12 PROCEDURE — 1090F PRES/ABSN URINE INCON ASSESS: CPT | Performed by: STUDENT IN AN ORGANIZED HEALTH CARE EDUCATION/TRAINING PROGRAM

## 2025-06-12 PROCEDURE — G2211 COMPLEX E/M VISIT ADD ON: HCPCS | Performed by: STUDENT IN AN ORGANIZED HEALTH CARE EDUCATION/TRAINING PROGRAM

## 2025-06-12 PROCEDURE — 1159F MED LIST DOCD IN RCRD: CPT | Performed by: STUDENT IN AN ORGANIZED HEALTH CARE EDUCATION/TRAINING PROGRAM

## 2025-06-12 PROCEDURE — G8419 CALC BMI OUT NRM PARAM NOF/U: HCPCS | Performed by: STUDENT IN AN ORGANIZED HEALTH CARE EDUCATION/TRAINING PROGRAM

## 2025-06-12 PROCEDURE — 99215 OFFICE O/P EST HI 40 MIN: CPT | Performed by: STUDENT IN AN ORGANIZED HEALTH CARE EDUCATION/TRAINING PROGRAM

## 2025-06-12 PROCEDURE — 1123F ACP DISCUSS/DSCN MKR DOCD: CPT | Performed by: STUDENT IN AN ORGANIZED HEALTH CARE EDUCATION/TRAINING PROGRAM

## 2025-06-12 PROCEDURE — 1126F AMNT PAIN NOTED NONE PRSNT: CPT | Performed by: STUDENT IN AN ORGANIZED HEALTH CARE EDUCATION/TRAINING PROGRAM

## 2025-06-12 PROCEDURE — G8427 DOCREV CUR MEDS BY ELIG CLIN: HCPCS | Performed by: STUDENT IN AN ORGANIZED HEALTH CARE EDUCATION/TRAINING PROGRAM

## 2025-06-12 PROCEDURE — 1036F TOBACCO NON-USER: CPT | Performed by: STUDENT IN AN ORGANIZED HEALTH CARE EDUCATION/TRAINING PROGRAM

## 2025-06-12 RX ORDER — IPRATROPIUM BROMIDE 21 UG/1
2 SPRAY, METERED NASAL EVERY 12 HOURS
Qty: 30 ML | Refills: 3 | Status: SHIPPED | OUTPATIENT
Start: 2025-06-12

## 2025-06-12 NOTE — PROGRESS NOTES
Chief Complaint   Patient presents with    Hypertension         \"Have you been to the ER, urgent care clinic since your last visit?  Hospitalized since your last visit?\"    NO    “Have you seen or consulted any other health care providers outside of [facility] since your last visit?”    NO            Click Here for Release of Records Request           3/13/2025     4:06 PM   PHQ-9    Little interest or pleasure in doing things 0   Feeling down, depressed, or hopeless 0   PHQ-2 Score 0   PHQ-9 Total Score 0           Financial Resource Strain: Low Risk  (3/13/2025)    Overall Financial Resource Strain (CARDIA)     Difficulty of Paying Living Expenses: Not hard at all      Food Insecurity: No Food Insecurity (3/13/2025)    Hunger Vital Sign     Worried About Running Out of Food in the Last Year: Never true     Ran Out of Food in the Last Year: Never true          Health Maintenance Due   Topic Date Due    DTaP/Tdap/Td vaccine (1 - Tdap) Never done    Shingles vaccine (1 of 2) Never done    Respiratory Syncytial Virus (RSV) Pregnant or age 60 yrs+ (1 - 1-dose 75+ series) Never done    Pneumococcal 50+ years Vaccine (2 of 2 - PPSV23) 08/30/2021

## 2025-06-12 NOTE — PROGRESS NOTES
Danni Harrington (:  1932) is a 89 y.o. female, Established patient, here for evaluation of the following chief complaint(s):  Hypertension         Assessment & Plan  1. Hypertension: Stable.  - Blood pressure readings at home: 134/70, 127/72, 137/67. Blood pressure today: 134/69.  - Continue amlodipine 10 mg once daily and metoprolol 50 mg once daily.    2. Allergic rhinitis: Chronic, unstable.  - Significant discomfort with eyes and nose, and postnasal drip.  - Switch to Atrovent nasal spray, 2 sprays in each nostril twice daily. Prescription for Atrovent nasal spray will be sent to pharmacy.  - Take Claritin 10 mg daily for the next week. Use over-the-counter eye drops for watery eyes.    3. Hypothyroidism.  Chronic, unstable  - Self discontinued Synthroid 88 mcg medication without prior consultation approximately a month ago.  - Thyroid function test will be conducted today to assess current status.    4. Balance issues.  - Reports feeling woozy when walking down the long hallway to apartment after getting off the elevator, attributed to inadequate food intake. Using a walker today for support.  - Balance issues believed to be related to hearing loss. Participating in general physical therapy at residence, which includes balance exercises.  - Consume snacks between meals to maintain energy levels. Option of physical therapy for balance and strength was discussed.    Follow-up  - Scheduled for a follow-up visit in 1 week.    Results    1. Primary hypertension  2. Non-seasonal allergic rhinitis, unspecified trigger  -     ipratropium (ATROVENT) 0.03 % nasal spray; 2 sprays by Each Nostril route in the morning and 2 sprays in the evening., Disp-30 mL, R-3Normal  3. Acquired hypothyroidism  -     TSH + Free T4 Panel; Future    Return in about 1 week (around 2025) for Chronic conditions follow up.       Subjective   History of Present Illness  The patient is a 89-year-old female here for

## 2025-06-13 LAB
T4 FREE SERPL-MCNC: 0.2 NG/DL (ref 0.8–1.5)
TSH SERPL DL<=0.05 MIU/L-ACNC: >100 UIU/ML (ref 0.36–3.74)

## 2025-06-16 ENCOUNTER — RESULTS FOLLOW-UP (OUTPATIENT)
Age: 89
End: 2025-06-16

## 2025-06-19 ENCOUNTER — OFFICE VISIT (OUTPATIENT)
Age: 89
End: 2025-06-19
Payer: MEDICARE

## 2025-06-19 VITALS
SYSTOLIC BLOOD PRESSURE: 129 MMHG | BODY MASS INDEX: 26.69 KG/M2 | HEART RATE: 70 BPM | DIASTOLIC BLOOD PRESSURE: 76 MMHG | RESPIRATION RATE: 16 BRPM | WEIGHT: 132.4 LBS | TEMPERATURE: 97.2 F | HEIGHT: 59 IN | OXYGEN SATURATION: 98 %

## 2025-06-19 DIAGNOSIS — E03.9 ACQUIRED HYPOTHYROIDISM: ICD-10-CM

## 2025-06-19 DIAGNOSIS — I10 PRIMARY HYPERTENSION: ICD-10-CM

## 2025-06-19 DIAGNOSIS — J30.89 NON-SEASONAL ALLERGIC RHINITIS DUE TO OTHER ALLERGIC TRIGGER: ICD-10-CM

## 2025-06-19 DIAGNOSIS — J01.01 ACUTE RECURRENT MAXILLARY SINUSITIS: Primary | ICD-10-CM

## 2025-06-19 PROCEDURE — 1036F TOBACCO NON-USER: CPT | Performed by: STUDENT IN AN ORGANIZED HEALTH CARE EDUCATION/TRAINING PROGRAM

## 2025-06-19 PROCEDURE — 99215 OFFICE O/P EST HI 40 MIN: CPT | Performed by: STUDENT IN AN ORGANIZED HEALTH CARE EDUCATION/TRAINING PROGRAM

## 2025-06-19 PROCEDURE — G8427 DOCREV CUR MEDS BY ELIG CLIN: HCPCS | Performed by: STUDENT IN AN ORGANIZED HEALTH CARE EDUCATION/TRAINING PROGRAM

## 2025-06-19 PROCEDURE — G2211 COMPLEX E/M VISIT ADD ON: HCPCS | Performed by: STUDENT IN AN ORGANIZED HEALTH CARE EDUCATION/TRAINING PROGRAM

## 2025-06-19 PROCEDURE — 1159F MED LIST DOCD IN RCRD: CPT | Performed by: STUDENT IN AN ORGANIZED HEALTH CARE EDUCATION/TRAINING PROGRAM

## 2025-06-19 PROCEDURE — 1090F PRES/ABSN URINE INCON ASSESS: CPT | Performed by: STUDENT IN AN ORGANIZED HEALTH CARE EDUCATION/TRAINING PROGRAM

## 2025-06-19 PROCEDURE — 1126F AMNT PAIN NOTED NONE PRSNT: CPT | Performed by: STUDENT IN AN ORGANIZED HEALTH CARE EDUCATION/TRAINING PROGRAM

## 2025-06-19 PROCEDURE — 1123F ACP DISCUSS/DSCN MKR DOCD: CPT | Performed by: STUDENT IN AN ORGANIZED HEALTH CARE EDUCATION/TRAINING PROGRAM

## 2025-06-19 PROCEDURE — G8419 CALC BMI OUT NRM PARAM NOF/U: HCPCS | Performed by: STUDENT IN AN ORGANIZED HEALTH CARE EDUCATION/TRAINING PROGRAM

## 2025-06-19 RX ORDER — MONTELUKAST SODIUM 10 MG/1
10 TABLET ORAL DAILY
Qty: 30 TABLET | Refills: 3 | Status: SHIPPED | OUTPATIENT
Start: 2025-06-19

## 2025-06-19 RX ORDER — AMOXICILLIN 500 MG/1
500 CAPSULE ORAL 2 TIMES DAILY
Qty: 14 CAPSULE | Refills: 0 | Status: SHIPPED | OUTPATIENT
Start: 2025-06-19 | End: 2025-06-26

## 2025-06-19 RX ORDER — AMOXICILLIN 875 MG/1
875 TABLET, COATED ORAL 2 TIMES DAILY
Qty: 20 TABLET | Refills: 0 | Status: CANCELLED | OUTPATIENT
Start: 2025-06-19 | End: 2025-06-29

## 2025-06-19 ASSESSMENT — PATIENT HEALTH QUESTIONNAIRE - PHQ9
1. LITTLE INTEREST OR PLEASURE IN DOING THINGS: NOT AT ALL
SUM OF ALL RESPONSES TO PHQ QUESTIONS 1-9: 0
2. FEELING DOWN, DEPRESSED OR HOPELESS: NOT AT ALL

## 2025-06-19 NOTE — PROGRESS NOTES
Danni Harrington (:  1932) is a 89 y.o. female, Established patient, here for evaluation of the following chief complaint(s):  Allergies (Recurrent all year round. Currently bothering eyes, and nose; post-nasal drip. Mild non-productive cough. Feels a little hoarseness this morning. No headache, no fever./Medication not working./Wants to know if there's an infection.)         Assessment & Plan  1. Allergic rhinitis: Chronic, unstable.  - Symptoms include recurrent allergies, postnasal drip, mild nonproductive cough, and hoarseness. Mild erythema with cobblestoning in the posterior oropharynx without exudates, and some bogginess in the nose. Associated with bacterial infection.  - Initiate daily Singulair (montelukast) to manage allergies and prevent future infections.  - Continue Claritin (loratadine) therapy.    2. Acute bacterial sinusitis: Recurrent  - Prescribe amoxicillin for bacterial infection.    3. Hypothyroidism: Chronic, unstable.  - TSH level exceeds 100, free T4 level is 0.2.  - Resume taking Synthroid (levothyroxine) dosage of 88 mcg.  - Prescription refill available upon request.    Follow-up  - Follow up in 3 months or sooner if necessary.    Results  Labs   - TSH: Over 100   - Free T4: 0.2  1. Acute recurrent maxillary sinusitis  -     amoxicillin (AMOXIL) 500 MG capsule; Take 1 capsule by mouth 2 times daily for 7 days, Disp-14 capsule, R-0Normal  2. Non-seasonal allergic rhinitis due to other allergic trigger  -     montelukast (SINGULAIR) 10 MG tablet; Take 1 tablet by mouth daily, Disp-30 tablet, R-3Normal  3. Acquired hypothyroidism  4. Primary hypertension    Return in about 3 months (around 2025) for Chronic conditions follow up.       Subjective   History of Present Illness  The patient is an 82-year-old female here for a follow-up visit on allergies and hypothyroidism.    Allergies  - Persistent symptoms of recurrent allergies, including eye and nose irritation, postnasal drip,

## 2025-06-19 NOTE — PROGRESS NOTES
Chief Complaint   Patient presents with    Allergies     Recurrent all year round. Currently bothering eyes, and nose; post-nasal drip. Mild non-productive cough. Feels a little hoarseness this morning. No headache, no fever.  Medication not working.  Wants to know if there's an infection.     \"Have you been to the ER, urgent care clinic since your last visit?  Hospitalized since your last visit?\"    NO    “Have you seen or consulted any other health care providers outside of [facility] since your last visit?”    NO            Click Here for Release of Records Request             6/19/2025    11:46 AM   PHQ-9    Little interest or pleasure in doing things 0   Feeling down, depressed, or hopeless 0   PHQ-2 Score 0   PHQ-9 Total Score 0           Financial Resource Strain: Low Risk  (3/13/2025)    Overall Financial Resource Strain (CARDIA)     Difficulty of Paying Living Expenses: Not hard at all      Food Insecurity: No Food Insecurity (3/13/2025)    Hunger Vital Sign     Worried About Running Out of Food in the Last Year: Never true     Ran Out of Food in the Last Year: Never true          Health Maintenance Due   Topic Date Due    DTaP/Tdap/Td vaccine (1 - Tdap) Never done    Shingles vaccine (1 of 2) Never done    Respiratory Syncytial Virus (RSV) Pregnant or age 60 yrs+ (1 - 1-dose 75+ series) Never done    Pneumococcal 50+ years Vaccine (2 of 2 - PPSV23) 08/30/2021

## 2025-08-26 DIAGNOSIS — I10 PRIMARY HYPERTENSION: ICD-10-CM

## 2025-08-26 RX ORDER — METOPROLOL SUCCINATE 50 MG/1
50 TABLET, EXTENDED RELEASE ORAL DAILY
Qty: 90 TABLET | Refills: 0 | Status: SHIPPED | OUTPATIENT
Start: 2025-08-26